# Patient Record
Sex: FEMALE | Race: WHITE | NOT HISPANIC OR LATINO | Employment: OTHER | URBAN - METROPOLITAN AREA
[De-identification: names, ages, dates, MRNs, and addresses within clinical notes are randomized per-mention and may not be internally consistent; named-entity substitution may affect disease eponyms.]

---

## 2025-02-04 ENCOUNTER — HOSPITAL ENCOUNTER (INPATIENT)
Facility: HOSPITAL | Age: 80
LOS: 3 days | Discharge: NURSING, REHAB OR LONG-TERM CARE | End: 2025-02-07
Attending: STUDENT IN AN ORGANIZED HEALTH CARE EDUCATION/TRAINING PROGRAM | Admitting: INTERNAL MEDICINE
Payer: MEDICARE

## 2025-02-04 ENCOUNTER — APPOINTMENT (OUTPATIENT)
Dept: RADIOLOGY | Facility: HOSPITAL | Age: 80
End: 2025-02-04
Payer: MEDICARE

## 2025-02-04 ENCOUNTER — APPOINTMENT (OUTPATIENT)
Dept: CARDIOLOGY | Facility: HOSPITAL | Age: 80
End: 2025-02-04
Payer: MEDICARE

## 2025-02-04 DIAGNOSIS — M62.82 NON-TRAUMATIC RHABDOMYOLYSIS: ICD-10-CM
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
## 2025-02-04 DIAGNOSIS — R94.31 ABNORMAL EKG: ICD-10-CM

## 2025-02-04 DIAGNOSIS — N39.0 ACUTE URINARY TRACT INFECTION: ICD-10-CM

## 2025-02-04 LAB
ALBUMIN SERPL BCP-MCNC: 4 G/DL (ref 3.4–5)
ALP SERPL-CCNC: 65 U/L (ref 33–136)
ALT SERPL W P-5'-P-CCNC: 71 U/L (ref 7–45)
ANION GAP SERPL CALCULATED.3IONS-SCNC: 18 MMOL/L (ref 10–20)
APPEARANCE UR: CLEAR
AST SERPL W P-5'-P-CCNC: 83 U/L (ref 9–39)
BACTERIA #/AREA URNS AUTO: ABNORMAL /HPF
BASOPHILS # BLD AUTO: 0.04 X10*3/UL (ref 0–0.1)
BASOPHILS NFR BLD AUTO: 0.3 %
BILIRUB SERPL-MCNC: 0.9 MG/DL (ref 0–1.2)
BILIRUB UR STRIP.AUTO-MCNC: NEGATIVE MG/DL
BUN SERPL-MCNC: 21 MG/DL (ref 6–23)
CALCIUM SERPL-MCNC: 9.5 MG/DL (ref 8.6–10.3)
CARDIAC TROPONIN I PNL SERPL HS: 163 NG/L (ref 0–13)
CARDIAC TROPONIN I PNL SERPL HS: 166 NG/L (ref 0–13)
CHLORIDE SERPL-SCNC: 114 MMOL/L (ref 98–107)
CK SERPL-CCNC: 2262 U/L (ref 0–215)
CO2 SERPL-SCNC: 25 MMOL/L (ref 21–32)
COLOR UR: YELLOW
CREAT SERPL-MCNC: 0.73 MG/DL (ref 0.5–1.05)
EGFRCR SERPLBLD CKD-EPI 2021: 84 ML/MIN/1.73M*2
EOSINOPHIL # BLD AUTO: 0.05 X10*3/UL (ref 0–0.4)
EOSINOPHIL NFR BLD AUTO: 0.4 %
ERYTHROCYTE [DISTWIDTH] IN BLOOD BY AUTOMATED COUNT: 14.6 % (ref 11.5–14.5)
FLUAV RNA RESP QL NAA+PROBE: NOT DETECTED
FLUBV RNA RESP QL NAA+PROBE: NOT DETECTED
GLUCOSE SERPL-MCNC: 105 MG/DL (ref 74–99)
GLUCOSE UR STRIP.AUTO-MCNC: NORMAL MG/DL
HCT VFR BLD AUTO: 42.2 % (ref 36–46)
HGB BLD-MCNC: 13.6 G/DL (ref 12–16)
HYALINE CASTS #/AREA URNS AUTO: ABNORMAL /LPF
IMM GRANULOCYTES # BLD AUTO: 0.03 X10*3/UL (ref 0–0.5)
IMM GRANULOCYTES NFR BLD AUTO: 0.2 % (ref 0–0.9)
KETONES UR STRIP.AUTO-MCNC: ABNORMAL MG/DL
LACTATE SERPL-SCNC: 1.4 MMOL/L (ref 0.4–2)
LEUKOCYTE ESTERASE UR QL STRIP.AUTO: NEGATIVE
LYMPHOCYTES # BLD AUTO: 1.03 X10*3/UL (ref 0.8–3)
LYMPHOCYTES NFR BLD AUTO: 8.4 %
MCH RBC QN AUTO: 29.8 PG (ref 26–34)
MCHC RBC AUTO-ENTMCNC: 32.2 G/DL (ref 32–36)
MCV RBC AUTO: 92 FL (ref 80–100)
MONOCYTES # BLD AUTO: 1.3 X10*3/UL (ref 0.05–0.8)
MONOCYTES NFR BLD AUTO: 10.6 %
MUCOUS THREADS #/AREA URNS AUTO: ABNORMAL /LPF
NEUTROPHILS # BLD AUTO: 9.86 X10*3/UL (ref 1.6–5.5)
NEUTROPHILS NFR BLD AUTO: 80.1 %
NITRITE UR QL STRIP.AUTO: NEGATIVE
NRBC BLD-RTO: 0 /100 WBCS (ref 0–0)
PH UR STRIP.AUTO: 6 [PH]
PLATELET # BLD AUTO: 341 X10*3/UL (ref 150–450)
POTASSIUM SERPL-SCNC: 4.7 MMOL/L (ref 3.5–5.3)
PROT SERPL-MCNC: 6.7 G/DL (ref 6.4–8.2)
PROT UR STRIP.AUTO-MCNC: ABNORMAL MG/DL
RBC # BLD AUTO: 4.57 X10*6/UL (ref 4–5.2)
RBC # UR STRIP.AUTO: ABNORMAL MG/DL
RBC #/AREA URNS AUTO: ABNORMAL /HPF
SARS-COV-2 RNA RESP QL NAA+PROBE: NOT DETECTED
SODIUM SERPL-SCNC: 152 MMOL/L (ref 136–145)
SP GR UR STRIP.AUTO: 1.03
UROBILINOGEN UR STRIP.AUTO-MCNC: ABNORMAL MG/DL
WBC # BLD AUTO: 12.3 X10*3/UL (ref 4.4–11.3)
WBC #/AREA URNS AUTO: ABNORMAL /HPF

## 2025-02-04 PROCEDURE — 70450 CT HEAD/BRAIN W/O DYE: CPT

## 2025-02-04 PROCEDURE — 73080 X-RAY EXAM OF ELBOW: CPT | Mod: LT

## 2025-02-04 PROCEDURE — 80053 COMPREHEN METABOLIC PANEL: CPT | Performed by: STUDENT IN AN ORGANIZED HEALTH CARE EDUCATION/TRAINING PROGRAM

## 2025-02-04 PROCEDURE — 73080 X-RAY EXAM OF ELBOW: CPT | Mod: LEFT SIDE | Performed by: RADIOLOGY

## 2025-02-04 PROCEDURE — 1200000002 HC GENERAL ROOM WITH TELEMETRY DAILY

## 2025-02-04 PROCEDURE — 36415 COLL VENOUS BLD VENIPUNCTURE: CPT | Performed by: STUDENT IN AN ORGANIZED HEALTH CARE EDUCATION/TRAINING PROGRAM

## 2025-02-04 PROCEDURE — 93005 ELECTROCARDIOGRAM TRACING: CPT

## 2025-02-04 PROCEDURE — 2500000004 HC RX 250 GENERAL PHARMACY W/ HCPCS (ALT 636 FOR OP/ED): Performed by: STUDENT IN AN ORGANIZED HEALTH CARE EDUCATION/TRAINING PROGRAM

## 2025-02-04 PROCEDURE — 96361 HYDRATE IV INFUSION ADD-ON: CPT

## 2025-02-04 PROCEDURE — 99285 EMERGENCY DEPT VISIT HI MDM: CPT | Mod: 25 | Performed by: STUDENT IN AN ORGANIZED HEALTH CARE EDUCATION/TRAINING PROGRAM

## 2025-02-04 PROCEDURE — 87636 SARSCOV2 & INF A&B AMP PRB: CPT | Performed by: STUDENT IN AN ORGANIZED HEALTH CARE EDUCATION/TRAINING PROGRAM

## 2025-02-04 PROCEDURE — 87040 BLOOD CULTURE FOR BACTERIA: CPT | Mod: WESLAB | Performed by: STUDENT IN AN ORGANIZED HEALTH CARE EDUCATION/TRAINING PROGRAM

## 2025-02-04 PROCEDURE — 96374 THER/PROPH/DIAG INJ IV PUSH: CPT

## 2025-02-04 PROCEDURE — 73502 X-RAY EXAM HIP UNI 2-3 VIEWS: CPT | Mod: LEFT SIDE | Performed by: RADIOLOGY

## 2025-02-04 PROCEDURE — 81001 URINALYSIS AUTO W/SCOPE: CPT | Performed by: STUDENT IN AN ORGANIZED HEALTH CARE EDUCATION/TRAINING PROGRAM

## 2025-02-04 PROCEDURE — 82550 ASSAY OF CK (CPK): CPT | Performed by: STUDENT IN AN ORGANIZED HEALTH CARE EDUCATION/TRAINING PROGRAM

## 2025-02-04 PROCEDURE — 2500000004 HC RX 250 GENERAL PHARMACY W/ HCPCS (ALT 636 FOR OP/ED): Performed by: INTERNAL MEDICINE

## 2025-02-04 PROCEDURE — 84484 ASSAY OF TROPONIN QUANT: CPT | Performed by: STUDENT IN AN ORGANIZED HEALTH CARE EDUCATION/TRAINING PROGRAM

## 2025-02-04 PROCEDURE — 85025 COMPLETE CBC W/AUTO DIFF WBC: CPT | Performed by: STUDENT IN AN ORGANIZED HEALTH CARE EDUCATION/TRAINING PROGRAM

## 2025-02-04 PROCEDURE — 73502 X-RAY EXAM HIP UNI 2-3 VIEWS: CPT | Mod: LT

## 2025-02-04 PROCEDURE — 83605 ASSAY OF LACTIC ACID: CPT | Performed by: STUDENT IN AN ORGANIZED HEALTH CARE EDUCATION/TRAINING PROGRAM

## 2025-02-04 PROCEDURE — P9612 CATHETERIZE FOR URINE SPEC: HCPCS

## 2025-02-04 PROCEDURE — 70450 CT HEAD/BRAIN W/O DYE: CPT | Performed by: RADIOLOGY

## 2025-02-04 RX ORDER — CEFTRIAXONE 1 G/50ML
1 INJECTION, SOLUTION INTRAVENOUS ONCE
Status: COMPLETED | OUTPATIENT
Start: 2025-02-04 | End: 2025-02-05

## 2025-02-04 RX ORDER — ONDANSETRON 4 MG/1
4 TABLET, ORALLY DISINTEGRATING ORAL 4 TIMES DAILY PRN
Status: DISCONTINUED | OUTPATIENT
Start: 2025-02-04 | End: 2025-02-07 | Stop reason: HOSPADM

## 2025-02-04 RX ORDER — ACETAMINOPHEN 325 MG/1
650 TABLET ORAL EVERY 6 HOURS PRN
Status: DISCONTINUED | OUTPATIENT
Start: 2025-02-04 | End: 2025-02-07 | Stop reason: HOSPADM

## 2025-02-04 RX ORDER — ACETAMINOPHEN 500 MG
5 TABLET ORAL NIGHTLY PRN
Status: DISCONTINUED | OUTPATIENT
Start: 2025-02-04 | End: 2025-02-07 | Stop reason: HOSPADM

## 2025-02-04 RX ORDER — SODIUM CHLORIDE 9 MG/ML
125 INJECTION, SOLUTION INTRAVENOUS CONTINUOUS
Status: DISCONTINUED | OUTPATIENT
Start: 2025-02-04 | End: 2025-02-05

## 2025-02-04 RX ORDER — ALUMINUM HYDROXIDE, MAGNESIUM HYDROXIDE, AND SIMETHICONE 1200; 120; 1200 MG/30ML; MG/30ML; MG/30ML
30 SUSPENSION ORAL 4 TIMES DAILY PRN
Status: DISCONTINUED | OUTPATIENT
Start: 2025-02-04 | End: 2025-02-07 | Stop reason: HOSPADM

## 2025-02-04 RX ORDER — BISACODYL 5 MG
5 TABLET, DELAYED RELEASE (ENTERIC COATED) ORAL DAILY PRN
Status: DISCONTINUED | OUTPATIENT
Start: 2025-02-04 | End: 2025-02-07 | Stop reason: HOSPADM

## 2025-02-04 RX ORDER — ENOXAPARIN SODIUM 100 MG/ML
40 INJECTION SUBCUTANEOUS DAILY
Status: DISCONTINUED | OUTPATIENT
Start: 2025-02-05 | End: 2025-02-07 | Stop reason: HOSPADM

## 2025-02-04 RX ORDER — ONDANSETRON HYDROCHLORIDE 2 MG/ML
4 INJECTION, SOLUTION INTRAVENOUS EVERY 6 HOURS PRN
Status: DISCONTINUED | OUTPATIENT
Start: 2025-02-04 | End: 2025-02-07 | Stop reason: HOSPADM

## 2025-02-04 RX ADMIN — CEFTRIAXONE SODIUM 1 G: 1 INJECTION, SOLUTION INTRAVENOUS at 23:03

## 2025-02-04 RX ADMIN — SODIUM CHLORIDE 125 ML/HR: 900 INJECTION, SOLUTION INTRAVENOUS at 23:55

## 2025-02-04 RX ADMIN — SODIUM CHLORIDE 1000 ML: 900 INJECTION, SOLUTION INTRAVENOUS at 20:40

## 2025-02-04 ASSESSMENT — COLUMBIA-SUICIDE SEVERITY RATING SCALE - C-SSRS
6. HAVE YOU EVER DONE ANYTHING, STARTED TO DO ANYTHING, OR PREPARED TO DO ANYTHING TO END YOUR LIFE?: NO
2. HAVE YOU ACTUALLY HAD ANY THOUGHTS OF KILLING YOURSELF?: NO
1. IN THE PAST MONTH, HAVE YOU WISHED YOU WERE DEAD OR WISHED YOU COULD GO TO SLEEP AND NOT WAKE UP?: NO

## 2025-02-04 ASSESSMENT — PAIN - FUNCTIONAL ASSESSMENT: PAIN_FUNCTIONAL_ASSESSMENT: 0-10

## 2025-02-04 ASSESSMENT — PAIN SCALES - GENERAL: PAINLEVEL_OUTOF10: 0 - NO PAIN

## 2025-02-05 ENCOUNTER — APPOINTMENT (OUTPATIENT)
Dept: RADIOLOGY | Facility: HOSPITAL | Age: 80
End: 2025-02-05
Payer: MEDICARE

## 2025-02-05 ENCOUNTER — APPOINTMENT (OUTPATIENT)
Dept: CARDIOLOGY | Facility: HOSPITAL | Age: 80
End: 2025-02-05
Payer: MEDICARE

## 2025-02-05 LAB
ALBUMIN SERPL BCP-MCNC: 3.2 G/DL (ref 3.4–5)
ALP SERPL-CCNC: 54 U/L (ref 33–136)
ALT SERPL W P-5'-P-CCNC: 55 U/L (ref 7–45)
ANION GAP SERPL CALCULATED.3IONS-SCNC: 11 MMOL/L (ref 10–20)
AORTIC VALVE PEAK VELOCITY: 1.65 M/S
APPEARANCE UR: CLEAR
AST SERPL W P-5'-P-CCNC: 53 U/L (ref 9–39)
ATRIAL RATE: 101 BPM
AV PEAK GRADIENT: 11 MMHG
AVA (PEAK VEL): 2.39 CM2
BILIRUB SERPL-MCNC: 0.6 MG/DL (ref 0–1.2)
BILIRUB UR STRIP.AUTO-MCNC: NEGATIVE MG/DL
BUN SERPL-MCNC: 19 MG/DL (ref 6–23)
CALCIUM SERPL-MCNC: 8.7 MG/DL (ref 8.6–10.3)
CHLORIDE SERPL-SCNC: 115 MMOL/L (ref 98–107)
CHOLEST SERPL-MCNC: 240 MG/DL (ref 0–199)
CHOLEST/HDLC SERPL: 5.5 {RATIO}
CK SERPL-CCNC: 1175 U/L (ref 0–215)
CO2 SERPL-SCNC: 27 MMOL/L (ref 21–32)
COLOR UR: YELLOW
CREAT SERPL-MCNC: 0.61 MG/DL (ref 0.5–1.05)
EGFRCR SERPLBLD CKD-EPI 2021: >90 ML/MIN/1.73M*2
EJECTION FRACTION APICAL 4 CHAMBER: 58.2
EJECTION FRACTION: 63 %
ERYTHROCYTE [DISTWIDTH] IN BLOOD BY AUTOMATED COUNT: 14.6 % (ref 11.5–14.5)
GLUCOSE SERPL-MCNC: 202 MG/DL (ref 74–99)
GLUCOSE UR STRIP.AUTO-MCNC: NORMAL MG/DL
HCT VFR BLD AUTO: 39.4 % (ref 36–46)
HDLC SERPL-MCNC: 43.9 MG/DL
HGB BLD-MCNC: 12.5 G/DL (ref 12–16)
HOLD SPECIMEN: NORMAL
KETONES UR STRIP.AUTO-MCNC: ABNORMAL MG/DL
LDLC SERPL CALC-MCNC: 173 MG/DL
LEFT ATRIUM VOLUME AREA LENGTH INDEX BSA: 21.9 ML/M2
LEFT VENTRICLE INTERNAL DIMENSION DIASTOLE: 3.77 CM (ref 3.5–6)
LEFT VENTRICULAR OUTFLOW TRACT DIAMETER: 1.98 CM
LEUKOCYTE ESTERASE UR QL STRIP.AUTO: NEGATIVE
LV EJECTION FRACTION BIPLANE: 59 %
MCH RBC QN AUTO: 29.5 PG (ref 26–34)
MCHC RBC AUTO-ENTMCNC: 31.7 G/DL (ref 32–36)
MCV RBC AUTO: 93 FL (ref 80–100)
MITRAL VALVE E/A RATIO: 1.11
MITRAL VALVE E/E' RATIO: 11.36
MUCOUS THREADS #/AREA URNS AUTO: NORMAL /LPF
NITRITE UR QL STRIP.AUTO: NEGATIVE
NON HDL CHOLESTEROL: 196 MG/DL (ref 0–149)
NRBC BLD-RTO: 0 /100 WBCS (ref 0–0)
P OFFSET: 186 MS
P ONSET: 112 MS
PH UR STRIP.AUTO: 6.5 [PH]
PLATELET # BLD AUTO: 319 X10*3/UL (ref 150–450)
POTASSIUM SERPL-SCNC: 2.5 MMOL/L (ref 3.5–5.3)
POTASSIUM SERPL-SCNC: 2.9 MMOL/L (ref 3.5–5.3)
PR INTERVAL: 206 MS
PROT SERPL-MCNC: 5.5 G/DL (ref 6.4–8.2)
PROT UR STRIP.AUTO-MCNC: ABNORMAL MG/DL
Q ONSET: 215 MS
QRS COUNT: 16 BEATS
QRS DURATION: 134 MS
QT INTERVAL: 420 MS
QTC CALCULATION(BAZETT): 544 MS
QTC FREDERICIA: 499 MS
R AXIS: -4 DEGREES
RBC # BLD AUTO: 4.24 X10*6/UL (ref 4–5.2)
RBC # UR STRIP.AUTO: NEGATIVE MG/DL
RBC #/AREA URNS AUTO: NORMAL /HPF
RIGHT VENTRICLE FREE WALL PEAK S': 16.51 CM/S
RIGHT VENTRICLE PEAK SYSTOLIC PRESSURE: 31.2 MMHG
SODIUM SERPL-SCNC: 150 MMOL/L (ref 136–145)
SP GR UR STRIP.AUTO: 1.03
T AXIS: 3 DEGREES
T OFFSET: 425 MS
TRICUSPID ANNULAR PLANE SYSTOLIC EXCURSION: 1.9 CM
TRIGL SERPL-MCNC: 118 MG/DL (ref 0–149)
TSH SERPL-ACNC: 1.45 MIU/L (ref 0.44–3.98)
UROBILINOGEN UR STRIP.AUTO-MCNC: ABNORMAL MG/DL
VENTRICULAR RATE: 101 BPM
VLDL: 24 MG/DL (ref 0–40)
WBC # BLD AUTO: 11 X10*3/UL (ref 4.4–11.3)
WBC #/AREA URNS AUTO: NORMAL /HPF

## 2025-02-05 PROCEDURE — 85027 COMPLETE CBC AUTOMATED: CPT | Performed by: INTERNAL MEDICINE

## 2025-02-05 PROCEDURE — 80061 LIPID PANEL: CPT

## 2025-02-05 PROCEDURE — 81001 URINALYSIS AUTO W/SCOPE: CPT | Performed by: INTERNAL MEDICINE

## 2025-02-05 PROCEDURE — 82550 ASSAY OF CK (CPK): CPT | Performed by: INTERNAL MEDICINE

## 2025-02-05 PROCEDURE — 2500000004 HC RX 250 GENERAL PHARMACY W/ HCPCS (ALT 636 FOR OP/ED): Performed by: INTERNAL MEDICINE

## 2025-02-05 PROCEDURE — 99204 OFFICE O/P NEW MOD 45 MIN: CPT

## 2025-02-05 PROCEDURE — 99232 SBSQ HOSP IP/OBS MODERATE 35: CPT | Performed by: INTERNAL MEDICINE

## 2025-02-05 PROCEDURE — 97165 OT EVAL LOW COMPLEX 30 MIN: CPT | Mod: GO

## 2025-02-05 PROCEDURE — 93356 MYOCRD STRAIN IMG SPCKL TRCK: CPT

## 2025-02-05 PROCEDURE — 36415 COLL VENOUS BLD VENIPUNCTURE: CPT | Performed by: INTERNAL MEDICINE

## 2025-02-05 PROCEDURE — 80053 COMPREHEN METABOLIC PANEL: CPT | Performed by: INTERNAL MEDICINE

## 2025-02-05 PROCEDURE — 2500000001 HC RX 250 WO HCPCS SELF ADMINISTERED DRUGS (ALT 637 FOR MEDICARE OP): Performed by: INTERNAL MEDICINE

## 2025-02-05 PROCEDURE — 93356 MYOCRD STRAIN IMG SPCKL TRCK: CPT | Performed by: INTERNAL MEDICINE

## 2025-02-05 PROCEDURE — 1200000002 HC GENERAL ROOM WITH TELEMETRY DAILY

## 2025-02-05 PROCEDURE — 99223 1ST HOSP IP/OBS HIGH 75: CPT | Performed by: INTERNAL MEDICINE

## 2025-02-05 PROCEDURE — 84443 ASSAY THYROID STIM HORMONE: CPT | Performed by: INTERNAL MEDICINE

## 2025-02-05 PROCEDURE — 76705 ECHO EXAM OF ABDOMEN: CPT

## 2025-02-05 PROCEDURE — 76376 3D RENDER W/INTRP POSTPROCES: CPT | Performed by: INTERNAL MEDICINE

## 2025-02-05 PROCEDURE — 99291 CRITICAL CARE FIRST HOUR: CPT | Performed by: STUDENT IN AN ORGANIZED HEALTH CARE EDUCATION/TRAINING PROGRAM

## 2025-02-05 PROCEDURE — 93306 TTE W/DOPPLER COMPLETE: CPT | Performed by: INTERNAL MEDICINE

## 2025-02-05 PROCEDURE — 76705 ECHO EXAM OF ABDOMEN: CPT | Performed by: RADIOLOGY

## 2025-02-05 PROCEDURE — 84132 ASSAY OF SERUM POTASSIUM: CPT | Performed by: INTERNAL MEDICINE

## 2025-02-05 RX ORDER — POTASSIUM CHLORIDE 1.5 G/1.58G
20 POWDER, FOR SOLUTION ORAL ONCE
Status: COMPLETED | OUTPATIENT
Start: 2025-02-05 | End: 2025-02-05

## 2025-02-05 RX ORDER — DEXTROSE MONOHYDRATE 50 MG/ML
125 INJECTION, SOLUTION INTRAVENOUS CONTINUOUS
Status: DISCONTINUED | OUTPATIENT
Start: 2025-02-05 | End: 2025-02-05

## 2025-02-05 RX ORDER — SODIUM CHLORIDE AND POTASSIUM CHLORIDE 150; 450 MG/100ML; MG/100ML
75 INJECTION, SOLUTION INTRAVENOUS CONTINUOUS
Status: DISCONTINUED | OUTPATIENT
Start: 2025-02-05 | End: 2025-02-07

## 2025-02-05 RX ORDER — POTASSIUM CHLORIDE 14.9 MG/ML
20 INJECTION INTRAVENOUS
Status: COMPLETED | OUTPATIENT
Start: 2025-02-05 | End: 2025-02-05

## 2025-02-05 RX ORDER — POTASSIUM CHLORIDE 1.5 G/1.58G
40 POWDER, FOR SOLUTION ORAL ONCE
Status: DISCONTINUED | OUTPATIENT
Start: 2025-02-05 | End: 2025-02-05

## 2025-02-05 RX ADMIN — SODIUM CHLORIDE AND POTASSIUM CHLORIDE 75 ML/HR: 4.5; 1.49 INJECTION, SOLUTION INTRAVENOUS at 16:46

## 2025-02-05 RX ADMIN — ENOXAPARIN SODIUM 40 MG: 40 INJECTION SUBCUTANEOUS at 10:46

## 2025-02-05 RX ADMIN — POTASSIUM CHLORIDE 20 MEQ: 200 INJECTION, SOLUTION INTRAVENOUS at 14:01

## 2025-02-05 RX ADMIN — DEXTROSE MONOHYDRATE 125 ML/HR: 5 INJECTION, SOLUTION INTRAVENOUS at 01:47

## 2025-02-05 RX ADMIN — DEXTROSE MONOHYDRATE 125 ML/HR: 5 INJECTION, SOLUTION INTRAVENOUS at 10:52

## 2025-02-05 RX ADMIN — POTASSIUM CHLORIDE 20 MEQ: 200 INJECTION, SOLUTION INTRAVENOUS at 10:47

## 2025-02-05 RX ADMIN — POTASSIUM CHLORIDE 20 MEQ: 1.5 FOR SOLUTION ORAL at 10:46

## 2025-02-05 SDOH — HEALTH STABILITY: PHYSICAL HEALTH: ON AVERAGE, HOW MANY MINUTES DO YOU ENGAGE IN EXERCISE AT THIS LEVEL?: 0 MIN

## 2025-02-05 SDOH — ECONOMIC STABILITY: FOOD INSECURITY: WITHIN THE PAST 12 MONTHS, YOU WORRIED THAT YOUR FOOD WOULD RUN OUT BEFORE YOU GOT THE MONEY TO BUY MORE.: NEVER TRUE

## 2025-02-05 SDOH — SOCIAL STABILITY: SOCIAL INSECURITY: ARE YOU OR HAVE YOU BEEN THREATENED OR ABUSED PHYSICALLY, EMOTIONALLY, OR SEXUALLY BY ANYONE?: NO

## 2025-02-05 SDOH — SOCIAL STABILITY: SOCIAL NETWORK
DO YOU BELONG TO ANY CLUBS OR ORGANIZATIONS SUCH AS CHURCH GROUPS, UNIONS, FRATERNAL OR ATHLETIC GROUPS, OR SCHOOL GROUPS?: NO

## 2025-02-05 SDOH — SOCIAL STABILITY: SOCIAL INSECURITY
WITHIN THE LAST YEAR, HAVE YOU BEEN KICKED, HIT, SLAPPED, OR OTHERWISE PHYSICALLY HURT BY YOUR PARTNER OR EX-PARTNER?: NO

## 2025-02-05 SDOH — SOCIAL STABILITY: SOCIAL INSECURITY
WITHIN THE LAST YEAR, HAVE YOU BEEN RAPED OR FORCED TO HAVE ANY KIND OF SEXUAL ACTIVITY BY YOUR PARTNER OR EX-PARTNER?: NO

## 2025-02-05 SDOH — SOCIAL STABILITY: SOCIAL INSECURITY: ARE YOU MARRIED, WIDOWED, DIVORCED, SEPARATED, NEVER MARRIED, OR LIVING WITH A PARTNER?: NEVER MARRIED

## 2025-02-05 SDOH — SOCIAL STABILITY: SOCIAL NETWORK: HOW OFTEN DO YOU ATTEND CHURCH OR RELIGIOUS SERVICES?: NEVER

## 2025-02-05 SDOH — HEALTH STABILITY: MENTAL HEALTH: HOW OFTEN DO YOU HAVE A DRINK CONTAINING ALCOHOL?: NEVER

## 2025-02-05 SDOH — SOCIAL STABILITY: SOCIAL INSECURITY: DO YOU FEEL ANYONE HAS EXPLOITED OR TAKEN ADVANTAGE OF YOU FINANCIALLY OR OF YOUR PERSONAL PROPERTY?: NO

## 2025-02-05 SDOH — ECONOMIC STABILITY: HOUSING INSECURITY: IN THE PAST 12 MONTHS, HOW MANY TIMES HAVE YOU MOVED WHERE YOU WERE LIVING?: 0

## 2025-02-05 SDOH — HEALTH STABILITY: MENTAL HEALTH: HOW MANY DRINKS CONTAINING ALCOHOL DO YOU HAVE ON A TYPICAL DAY WHEN YOU ARE DRINKING?: PATIENT DOES NOT DRINK

## 2025-02-05 SDOH — SOCIAL STABILITY: SOCIAL NETWORK: HOW OFTEN DO YOU GET TOGETHER WITH FRIENDS OR RELATIVES?: TWICE A WEEK

## 2025-02-05 SDOH — HEALTH STABILITY: MENTAL HEALTH: HOW OFTEN DO YOU HAVE SIX OR MORE DRINKS ON ONE OCCASION?: NEVER

## 2025-02-05 SDOH — ECONOMIC STABILITY: FOOD INSECURITY: HOW HARD IS IT FOR YOU TO PAY FOR THE VERY BASICS LIKE FOOD, HOUSING, MEDICAL CARE, AND HEATING?: NOT HARD AT ALL

## 2025-02-05 SDOH — SOCIAL STABILITY: SOCIAL INSECURITY: HAVE YOU HAD ANY THOUGHTS OF HARMING ANYONE ELSE?: NO

## 2025-02-05 SDOH — SOCIAL STABILITY: SOCIAL INSECURITY: WITHIN THE LAST YEAR, HAVE YOU BEEN AFRAID OF YOUR PARTNER OR EX-PARTNER?: NO

## 2025-02-05 SDOH — SOCIAL STABILITY: SOCIAL INSECURITY: DO YOU FEEL UNSAFE GOING BACK TO THE PLACE WHERE YOU ARE LIVING?: NO

## 2025-02-05 SDOH — SOCIAL STABILITY: SOCIAL INSECURITY: HAVE YOU HAD THOUGHTS OF HARMING ANYONE ELSE?: NO

## 2025-02-05 SDOH — SOCIAL STABILITY: SOCIAL INSECURITY: ARE THERE ANY APPARENT SIGNS OF INJURIES/BEHAVIORS THAT COULD BE RELATED TO ABUSE/NEGLECT?: NO

## 2025-02-05 SDOH — ECONOMIC STABILITY: HOUSING INSECURITY: AT ANY TIME IN THE PAST 12 MONTHS, WERE YOU HOMELESS OR LIVING IN A SHELTER (INCLUDING NOW)?: NO

## 2025-02-05 SDOH — HEALTH STABILITY: MENTAL HEALTH
DO YOU FEEL STRESS - TENSE, RESTLESS, NERVOUS, OR ANXIOUS, OR UNABLE TO SLEEP AT NIGHT BECAUSE YOUR MIND IS TROUBLED ALL THE TIME - THESE DAYS?: ONLY A LITTLE

## 2025-02-05 SDOH — SOCIAL STABILITY: SOCIAL INSECURITY: DOES ANYONE TRY TO KEEP YOU FROM HAVING/CONTACTING OTHER FRIENDS OR DOING THINGS OUTSIDE YOUR HOME?: NO

## 2025-02-05 SDOH — SOCIAL STABILITY: SOCIAL NETWORK: HOW OFTEN DO YOU ATTEND MEETINGS OF THE CLUBS OR ORGANIZATIONS YOU BELONG TO?: NEVER

## 2025-02-05 SDOH — SOCIAL STABILITY: SOCIAL INSECURITY: POSSIBLE ABUSE REPORTED TO:: ADVOCATE

## 2025-02-05 SDOH — HEALTH STABILITY: MENTAL HEALTH: EXPERIENCED ANY OF THE FOLLOWING LIFE EVENTS: SOCIAL LOSS (BANKRUPTCY, DIVORCE, WORK-RELATED STRESS)

## 2025-02-05 SDOH — SOCIAL STABILITY: SOCIAL INSECURITY: HAS ANYONE EVER THREATENED TO HURT YOUR FAMILY OR YOUR PETS?: NO

## 2025-02-05 SDOH — ECONOMIC STABILITY: HOUSING INSECURITY: IN THE LAST 12 MONTHS, WAS THERE A TIME WHEN YOU WERE NOT ABLE TO PAY THE MORTGAGE OR RENT ON TIME?: NO

## 2025-02-05 SDOH — SOCIAL STABILITY: SOCIAL INSECURITY: WERE YOU ABLE TO COMPLETE ALL THE BEHAVIORAL HEALTH SCREENINGS?: YES

## 2025-02-05 SDOH — ECONOMIC STABILITY: FOOD INSECURITY: WITHIN THE PAST 12 MONTHS, THE FOOD YOU BOUGHT JUST DIDN'T LAST AND YOU DIDN'T HAVE MONEY TO GET MORE.: NEVER TRUE

## 2025-02-05 SDOH — ECONOMIC STABILITY: TRANSPORTATION INSECURITY: IN THE PAST 12 MONTHS, HAS LACK OF TRANSPORTATION KEPT YOU FROM MEDICAL APPOINTMENTS OR FROM GETTING MEDICATIONS?: NO

## 2025-02-05 SDOH — ECONOMIC STABILITY: INCOME INSECURITY: IN THE PAST 12 MONTHS HAS THE ELECTRIC, GAS, OIL, OR WATER COMPANY THREATENED TO SHUT OFF SERVICES IN YOUR HOME?: NO

## 2025-02-05 SDOH — SOCIAL STABILITY: SOCIAL INSECURITY: WITHIN THE LAST YEAR, HAVE YOU BEEN HUMILIATED OR EMOTIONALLY ABUSED IN OTHER WAYS BY YOUR PARTNER OR EX-PARTNER?: NO

## 2025-02-05 SDOH — HEALTH STABILITY: PHYSICAL HEALTH
HOW OFTEN DO YOU NEED TO HAVE SOMEONE HELP YOU WHEN YOU READ INSTRUCTIONS, PAMPHLETS, OR OTHER WRITTEN MATERIAL FROM YOUR DOCTOR OR PHARMACY?: NEVER

## 2025-02-05 SDOH — HEALTH STABILITY: PHYSICAL HEALTH: ON AVERAGE, HOW MANY DAYS PER WEEK DO YOU ENGAGE IN MODERATE TO STRENUOUS EXERCISE (LIKE A BRISK WALK)?: 0 DAYS

## 2025-02-05 SDOH — SOCIAL STABILITY: SOCIAL NETWORK: IN A TYPICAL WEEK, HOW MANY TIMES DO YOU TALK ON THE PHONE WITH FAMILY, FRIENDS, OR NEIGHBORS?: NEVER

## 2025-02-05 SDOH — SOCIAL STABILITY: SOCIAL INSECURITY: ABUSE: ADULT

## 2025-02-05 ASSESSMENT — COGNITIVE AND FUNCTIONAL STATUS - GENERAL
CLIMB 3 TO 5 STEPS WITH RAILING: TOTAL
PERSONAL GROOMING: A LOT
EATING MEALS: A LOT
MOBILITY SCORE: 15
DRESSING REGULAR UPPER BODY CLOTHING: A LITTLE
MOVING TO AND FROM BED TO CHAIR: A LITTLE
HELP NEEDED FOR BATHING: A LITTLE
EATING MEALS: A LITTLE
PERSONAL GROOMING: A LOT
DRESSING REGULAR LOWER BODY CLOTHING: TOTAL
CLIMB 3 TO 5 STEPS WITH RAILING: TOTAL
MOVING FROM LYING ON BACK TO SITTING ON SIDE OF FLAT BED WITH BEDRAILS: A LITTLE
PATIENT BASELINE BEDBOUND: NO
DAILY ACTIVITIY SCORE: 16
EATING MEALS: A LITTLE
PERSONAL GROOMING: A LOT
WALKING IN HOSPITAL ROOM: A LITTLE
DRESSING REGULAR UPPER BODY CLOTHING: A LITTLE
TOILETING: A LOT
MOVING FROM LYING ON BACK TO SITTING ON SIDE OF FLAT BED WITH BEDRAILS: A LITTLE
TURNING FROM BACK TO SIDE WHILE IN FLAT BAD: A LITTLE
DRESSING REGULAR UPPER BODY CLOTHING: A LOT
DAILY ACTIVITIY SCORE: 16
STANDING UP FROM CHAIR USING ARMS: A LITTLE
DRESSING REGULAR LOWER BODY CLOTHING: A LITTLE
WALKING IN HOSPITAL ROOM: A LITTLE
TOILETING: A LOT
HELP NEEDED FOR BATHING: A LOT
STANDING UP FROM CHAIR USING ARMS: A LITTLE
HELP NEEDED FOR BATHING: A LITTLE
MOBILITY SCORE: 16
DAILY ACTIVITIY SCORE: 11
TURNING FROM BACK TO SIDE WHILE IN FLAT BAD: A LOT
DRESSING REGULAR LOWER BODY CLOTHING: A LITTLE
MOVING TO AND FROM BED TO CHAIR: A LITTLE
TOILETING: A LOT

## 2025-02-05 ASSESSMENT — ENCOUNTER SYMPTOMS
ALLERGIC/IMMUNOLOGIC NEGATIVE: 1
CONSTITUTIONAL NEGATIVE: 1
RESPIRATORY NEGATIVE: 1
EYES NEGATIVE: 1
MUSCULOSKELETAL NEGATIVE: 1
GASTROINTESTINAL NEGATIVE: 1
HEMATOLOGIC/LYMPHATIC NEGATIVE: 1
NEUROLOGICAL NEGATIVE: 1
PSYCHIATRIC NEGATIVE: 1
CARDIOVASCULAR NEGATIVE: 1
ENDOCRINE NEGATIVE: 1

## 2025-02-05 ASSESSMENT — ACTIVITIES OF DAILY LIVING (ADL)
JUDGMENT_ADEQUATE_SAFELY_COMPLETE_DAILY_ACTIVITIES: YES
LACK_OF_TRANSPORTATION: NO
ADEQUATE_TO_COMPLETE_ADL: YES
HEARING - RIGHT EAR: FUNCTIONAL
TOILETING: INDEPENDENT
PATIENT'S MEMORY ADEQUATE TO SAFELY COMPLETE DAILY ACTIVITIES?: NO
DRESSING YOURSELF: INDEPENDENT
LACK_OF_TRANSPORTATION: NO
HEARING - LEFT EAR: FUNCTIONAL
LACK_OF_TRANSPORTATION: NO
ADL_ASSISTANCE: INDEPENDENT
WALKS IN HOME: INDEPENDENT
FEEDING YOURSELF: INDEPENDENT
GROOMING: INDEPENDENT
BATHING: INDEPENDENT

## 2025-02-05 ASSESSMENT — PATIENT HEALTH QUESTIONNAIRE - PHQ9
2. FEELING DOWN, DEPRESSED OR HOPELESS: NOT AT ALL
SUM OF ALL RESPONSES TO PHQ9 QUESTIONS 1 & 2: 0
1. LITTLE INTEREST OR PLEASURE IN DOING THINGS: NOT AT ALL

## 2025-02-05 ASSESSMENT — PAIN SCALES - GENERAL
PAINLEVEL_OUTOF10: 0 - NO PAIN
PAINLEVEL_OUTOF10: 0 - NO PAIN

## 2025-02-05 ASSESSMENT — LIFESTYLE VARIABLES
PRESCIPTION_ABUSE_PAST_12_MONTHS: NO
AUDIT-C TOTAL SCORE: 0
TOTAL SCORE: 0
HAVE PEOPLE ANNOYED YOU BY CRITICIZING YOUR DRINKING: NO
HOW OFTEN DO YOU HAVE 6 OR MORE DRINKS ON ONE OCCASION: NEVER
AUDIT-C TOTAL SCORE: 0
SKIP TO QUESTIONS 9-10: 1
HOW MANY STANDARD DRINKS CONTAINING ALCOHOL DO YOU HAVE ON A TYPICAL DAY: PATIENT DOES NOT DRINK
SUBSTANCE_ABUSE_PAST_12_MONTHS: NO
AUDIT-C TOTAL SCORE: 0
EVER FELT BAD OR GUILTY ABOUT YOUR DRINKING: NO
HOW OFTEN DO YOU HAVE A DRINK CONTAINING ALCOHOL: NEVER
EVER HAD A DRINK FIRST THING IN THE MORNING TO STEADY YOUR NERVES TO GET RID OF A HANGOVER: NO
HAVE YOU EVER FELT YOU SHOULD CUT DOWN ON YOUR DRINKING: NO
SKIP TO QUESTIONS 9-10: 1

## 2025-02-05 ASSESSMENT — PAIN - FUNCTIONAL ASSESSMENT: PAIN_FUNCTIONAL_ASSESSMENT: 0-10

## 2025-02-05 NOTE — NURSING NOTE
Patient was cleaned up, full head to toe bath, gown changed with a partial linen change. Patient is incontinent at times so a pure wick was placed.

## 2025-02-05 NOTE — PROGRESS NOTES
"Shirley Grajeda is a 79 y.o. female on day 1 of admission presenting with fall and rhabdomyolysis.     Subjective   She was found on the floor of her home last night by her son.  She was brought to the emergency department at [facility] and found to be in mild rhabdomyolysis.  She had an abnormal urinalysis with WBCs.  At the time of this encounter, she said she did not recall what happened at her home last night. She was awake and alert, oriented to self, date, place.   She has no acute complaints.      Objective     Physical Exam  General: awake, alert, oriented, responsive  Cardiovascular: regular, normal S1 and S2  Lungs: good air entry bilaterally, clear to auscultation  Extremities: no peripheral cyanosis, no pedal edema  Neuro: alert, oriented x 3, no focal weakness      Last Recorded Vitals  Blood pressure (!) 123/94, pulse 84, temperature 36.4 °C (97.5 °F), temperature source Oral, resp. rate 16, height 1.575 m (5' 2\"), weight 68 kg (150 lb), SpO2 97%.  Intake/Output last 3 Shifts:  No intake/output data recorded.    Relevant Results  Lab Results   Component Value Date    WBC 11.0 02/05/2025    HGB 12.5 02/05/2025    HCT 39.4 02/05/2025    MCV 93 02/05/2025     02/05/2025     Lab Results   Component Value Date    GLUCOSE 202 (H) 02/05/2025    CALCIUM 8.7 02/05/2025     (H) 02/05/2025    K 2.9 (LL) 02/05/2025    CO2 27 02/05/2025     (H) 02/05/2025    BUN 19 02/05/2025    CREATININE 0.61 02/05/2025     Scheduled medications  enoxaparin, 40 mg, subcutaneous, Daily  potassium chloride, 20 mEq, intravenous, q2h      Continuous medications  dextrose 5%, 125 mL/hr, Last Rate: 125 mL/hr (02/05/25 0654)      PRN medications  PRN medications: acetaminophen, alum-mag hydroxide-simeth, bisacodyl, melatonin, ondansetron, ondansetron ODT      Assessment/Plan   Assessment & Plan    Fall.    Physical Occupational Therapy.  Patient may need to go to rehab.  She tells me that she has been falling in the " past    Rhabdomyolysis  Continue IV hydration and trend CK levels    Hypernatremia  On 1/2 normal saline    Hypokalemia  Replace potassium    Abnormal urinalysis  On IV rocephin    Elevated liver enzymes  Mild biliary sludge and fatty liver on ultrasound  Trend enzyme levels    Abnormal EKG.   An EKG showed right bundle branch block  Echocardiogram done  Seen by cardiology for abnormal EKG and elevated troponin      Plan  Abnormal UA: Urine culture was not done on the urine sample sent to the lab yesterday, because it was negative for leukocyte esterase.  The patient's nurse today states that the urine appears malodorous.  A repeat urinalysis with reflex microscopy and culture has been ordered.    Tay Roth MD

## 2025-02-05 NOTE — H&P
History Of Present Illness  Shirley Grajeda is a 79 y.o. female presenting with status post fall.  Patient denies any significant past medical history.  She tells me that she does not follow with doctors.  She does not take any medications.  Patient lives alone, she normally ambulates with a walker.  She cannot tell me when she had anything to eat or drink last time.  She agrees that she is dehydrated.  At 3 PM yesterday her son checked on her and she was at her baseline.  He will checked on her at 8 PM and found him on the floor in the bathroom.  According to the patient, she fell when she was trying to get off the toilet.  Denies loss of consciousness.  She tells that she spent on the floor about 2 hours.  When patient was brought to the emergency department, she had elevated CK so she received IV fluids.  Urinalysis was positive for UTI so she received 1 g of Rocephin.  During exam, patient denies any acute problems.  Past Medical History  She has no past medical history on file.  Denies  Surgical History  She has no past surgical history on file.  Eye surgery  Social History  She has no history on file for tobacco use, alcohol use, and drug use.  Lives alone, ambulates with a walker  Family History  No family history on file.  Patient cannot provide  Allergies  Penicillins    Review of Systems   Constitutional: Negative.    HENT: Negative.     Eyes: Negative.    Respiratory: Negative.     Cardiovascular: Negative.    Gastrointestinal: Negative.    Endocrine: Negative.    Genitourinary: Negative.    Musculoskeletal: Negative.    Skin: Negative.    Allergic/Immunologic: Negative.    Neurological: Negative.    Hematological: Negative.    Psychiatric/Behavioral: Negative.     Patient is a very suboptimal historian.     Physical Exam  Location: Emergency department, room 12.  Pi is in no apparent distress.   Cooperative with exam.  Nontoxic-appearing.  Comfortable at rest on room air.  Skin is clean, dry.  No skin  lesions, rashes, ecchymosis.  Head is atraumatic, normocephalic.  Mouth mucosa is pink and very dry. Her lips are cracked.  No mucosal lesions.  No nasal discharge.  Musculoskeletal: No deformities, no muscle swelling.  No point tenderness to palpation.  Neck is supple, no JVD, no carotid bruits.  No lymphadenopathy.  Chest is atraumatic.  No tenderness to palpation.  Lungs are clear to auscultation bilaterally.  No wheezes, no rales, no rhonchi.  Heart: Regular rate and rhythm S1-S2.  Monitor demonstrates sinus tachycardia no murmurs, rubs, gallops.  Peripheral pulses are palpable in all extremities, equal.  Abdomen is soft, not tender, not distended.  Bowel sounds positive in all quadrants.  No rebound, no guarding.  Rectal exam deferred.  Extremities: No peripheral edema, cords, cyanosis, varices.  Neuro: Patient is alert, oriented to name and place.  She knows that she is in the hospital.  Moves all extremities.  No gross focal neurological deficits.  Face is symmetrical.  Tongue is midline.  No visual abnormalities.  No dysarthria or aphasia.  Psychiatric: Patient is cooperative with exam, maintains good eye contact.  No evidence of psychosis, suicidal ideation or depression.   Last Recorded Vitals  /72   Pulse (!) 109   Temp 36.4 °C (97.5 °F) (Oral)   Resp 19   Wt 68 kg (150 lb)   SpO2 97%     Relevant Results        CT head wo IV contrast    Result Date: 2/4/2025  Interpreted By:  Octavio Norwood, STUDY: CT HEAD WO IV CONTRAST;  2/4/2025 9:55 pm   INDICATION: Signs/Symptoms:fall, confusion.     COMPARISON: None.   ACCESSION NUMBER(S): [id number]   ORDERING CLINICIAN: LATIA MEIER   TECHNIQUE: Noncontrast axial CT scan of head was performed. Angled reformats in brain and bone windows were generated. The images were reviewed in bone, brain, blood and soft tissue windows.   FINDINGS: CSF Spaces: The ventricles, sulci and basal cisterns are within normal limits. There is no extraaxial fluid  collection. Global volume loss with prominence of the temporal horns. Query dementia.   Parenchyma:  The grey-white differentiation is intact. There is no mass effect or midline shift.  There is no intracranial hemorrhage.   Calvarium: The calvarium is unremarkable.   Paranasal sinuses and mastoids: Visualized paranasal sinuses and mastoids are clear.       No evidence of acute cortical infarct or intracranial hemorrhage. Global volume loss.. Query any clinical evidence of dementia   MACRO: None   Signed by: Octavio Norwood 2/4/2025 10:48 PM Dictation workstation:   [id number]    XR elbow left 3+ views    Result Date: 2/4/2025  Interpreted By:  Pola Becker, STUDY: XR ELBOW LEFT 3+ VIEWS; ;  2/4/2025 9:45 pm   INDICATION: Signs/Symptoms:fall, bruising to left hip and elbow.     COMPARISON: None.   ACCESSION NUMBER(S): [id number]   ORDERING CLINICIAN: LATIA MEIER   FINDINGS: Left elbow, three views   Markedly limited study due to projection. Evaluation for joint effusion is limited. No definite fracture seen on the views. There is soft tissue edema about the elbow       Limited study due to projection. No definite fracture seen. Soft tissue edema about the elbow noted.     MACRO: None   Signed by: Pola Becker 2/4/2025 9:59 PM Dictation workstation:   [id number]    XR hip left with pelvis when performed 2 or 3 views    Result Date: 2/4/2025  Interpreted By:  Pola Becker, STUDY: XR HIP LEFT WITH PELVIS WHEN PERFORMED 2 OR 3 VIEWS; ;  2/4/2025 9:45 pm   INDICATION: Signs/Symptoms:fall, bruising to left hip and elbow.     COMPARISON: None.   ACCESSION NUMBER(S): [id number]   ORDERING CLINICIAN: LATIA MEIER   FINDINGS: Left hip, three views   No definite fracture seen. There is no dislocation. Mild osteophytosis present in the hips       No acute osseous abnormality seen radiographically.     MACRO: None   Signed by: Pola Becker 2/4/2025 9:58 PM Dictation workstation:   [id number]       Results for orders placed or performed during the hospital encounter of 02/04/25 (from the past 24 hours)   CBC and Auto Differential   Result Value Ref Range    WBC 12.3 (H) 4.4 - 11.3 x10*3/uL    nRBC 0.0 0.0 - 0.0 /100 WBCs    RBC 4.57 4.00 - 5.20 x10*6/uL    Hemoglobin 13.6 12.0 - 16.0 g/dL    Hematocrit 42.2 36.0 - 46.0 %    MCV 92 80 - 100 fL    MCH 29.8 26.0 - 34.0 pg    MCHC 32.2 32.0 - 36.0 g/dL    RDW 14.6 (H) 11.5 - 14.5 %    Platelets 341 150 - 450 x10*3/uL    Neutrophils % 80.1 40.0 - 80.0 %    Immature Granulocytes %, Automated 0.2 0.0 - 0.9 %    Lymphocytes % 8.4 13.0 - 44.0 %    Monocytes % 10.6 2.0 - 10.0 %    Eosinophils % 0.4 0.0 - 6.0 %    Basophils % 0.3 0.0 - 2.0 %    Neutrophils Absolute 9.86 (H) 1.60 - 5.50 x10*3/uL    Immature Granulocytes Absolute, Automated 0.03 0.00 - 0.50 x10*3/uL    Lymphocytes Absolute 1.03 0.80 - 3.00 x10*3/uL    Monocytes Absolute 1.30 (H) 0.05 - 0.80 x10*3/uL    Eosinophils Absolute 0.05 0.00 - 0.40 x10*3/uL    Basophils Absolute 0.04 0.00 - 0.10 x10*3/uL   Comprehensive metabolic panel   Result Value Ref Range    Glucose 105 (H) 74 - 99 mg/dL    Sodium 152 (H) 136 - 145 mmol/L    Potassium 4.7 3.5 - 5.3 mmol/L    Chloride 114 (H) 98 - 107 mmol/L    Bicarbonate 25 21 - 32 mmol/L    Anion Gap 18 10 - 20 mmol/L    Urea Nitrogen 21 6 - 23 mg/dL    Creatinine 0.73 0.50 - 1.05 mg/dL    eGFR 84 >60 mL/min/1.73m*2    Calcium 9.5 8.6 - 10.3 mg/dL    Albumin 4.0 3.4 - 5.0 g/dL    Alkaline Phosphatase 65 33 - 136 U/L    Total Protein 6.7 6.4 - 8.2 g/dL    AST 83 (H) 9 - 39 U/L    Bilirubin, Total 0.9 0.0 - 1.2 mg/dL    ALT 71 (H) 7 - 45 U/L   Creatine Kinase   Result Value Ref Range    Creatine Kinase 2,262 (H) 0 - 215 U/L   Sars-CoV-2 and Influenza A/B PCR   Result Value Ref Range    Flu A Result Not Detected Not Detected    Flu B Result Not Detected Not Detected    Coronavirus 2019, PCR Not Detected Not Detected   Troponin I, High Sensitivity, Initial   Result Value Ref  Range    Troponin I, High Sensitivity 166 (HH) 0 - 13 ng/L   Urinalysis with Reflex Culture and Microscopic   Result Value Ref Range    Color, Urine Yellow Light-Yellow, Yellow, Dark-Yellow    Appearance, Urine Clear Clear    Specific Gravity, Urine 1.031 1.005 - 1.035    pH, Urine 6.0 5.0, 5.5, 6.0, 6.5, 7.0, 7.5, 8.0    Protein, Urine 70 (1+) (A) NEGATIVE, 10 (TRACE), 20 (TRACE) mg/dL    Glucose, Urine Normal Normal mg/dL    Blood, Urine 0.03 (TRACE) (A) NEGATIVE mg/dL    Ketones, Urine OVER (4+) (A) NEGATIVE mg/dL    Bilirubin, Urine NEGATIVE NEGATIVE mg/dL    Urobilinogen, Urine 3 (1+) (A) Normal mg/dL    Nitrite, Urine NEGATIVE NEGATIVE    Leukocyte Esterase, Urine NEGATIVE NEGATIVE   Urinalysis Microscopic   Result Value Ref Range    WBC, Urine 6-10 (A) 1-5, NONE /HPF    RBC, Urine 1-2 NONE, 1-2, 3-5 /HPF    Bacteria, Urine 2+ (A) NONE SEEN /HPF    Mucus, Urine 4+ Reference range not established. /LPF    Hyaline Casts, Urine 1+ (A) NONE /LPF   Troponin, High Sensitivity, 1 Hour   Result Value Ref Range    Troponin I, High Sensitivity 163 (HH) 0 - 13 ng/L   Lactate   Result Value Ref Range    Lactate 1.4 0.4 - 2.0 mmol/L      EKG, reviewed myself: Sinus tachycardia with right bundle branch block and inverted T waves in lateral precordial leads.     Assessment/Plan   Assessment & Plan  Fall, initial encounter      Fall.  Physical Occupational Therapy.  Patient may need to go to rehab.  She tells me that she has been falling in the past.  Rhabdomyolysis.  IV hydration.  Monitor CK level  UTI.  IV Rocephin.  Follow culture  Abnormal LFTs.  On exam, there is no right upper quadrant tenderness.  Monitor LFTs, check right upper quadrant ultrasound.  Abnormal EKG.  Check echocardiogram, consult cardiologist.  Patient also has some elevation of troponin.  She denies chest pain or shortness of breath.  Elevation of troponin may be related to rhabdomyolysis.  DVT prophylaxis.  Lovenox  Dehydration.  Patient has  significant hypernatremia.  Will change IV fluids to dextrose only.         Lennie Campoverde MD

## 2025-02-05 NOTE — PROGRESS NOTES
02/05/25 1148   [facility] Disability Status   Are you deaf or do you have serious difficulty hearing? N   Are you blind or do you have serious difficulty seeing, even when wearing glasses? N   Because of a physical, mental, or emotional condition, do you have serious difficulty concentrating, remembering, or making decisions? (5 years old or older) N   Do you have serious difficulty walking or climbing stairs? N   Do you have serious difficulty dressing or bathing? N   Because of a physical, mental, or emotional condition, do you have serious difficulty doing errands alone such as visiting the doctor? N

## 2025-02-05 NOTE — PROGRESS NOTES
02/05/25 1145   Physical Activity   On average, how many days per week do you engage in moderate to strenuous exercise (like a brisk walk)? 0 days   On average, how many minutes do you engage in exercise at this level? 0 min   Financial Resource Strain   How hard is it for you to pay for the very basics like food, housing, medical care, and heating? Not hard   Housing Stability   In the last 12 months, was there a time when you were not able to pay the mortgage or rent on time? N   In the past 12 months, how many times have you moved where you were living? 0   At any time in the past 12 months, were you homeless or living in a shelter (including now)? N   Transportation Needs   In the past 12 months, has lack of transportation kept you from medical appointments or from getting medications? no   In the past 12 months, has lack of transportation kept you from meetings, work, or from getting things needed for daily living? No   Food Insecurity   Within the past 12 months, you worried that your food would run out before you got the money to buy more. Never true   Within the past 12 months, the food you bought just didn't last and you didn't have money to get more. Never true   Stress   Do you feel stress - tense, restless, nervous, or anxious, or unable to sleep at night because your mind is troubled all the time - these days? Only a littl   Social Connections   In a typical week, how many times do you talk on the phone with family, friends, or neighbors? Never   How often do you get together with friends or relatives? Twice   How often do you attend Lutheran or Taoist services? Never   Do you belong to any clubs or organizations such as Lutheran groups, unions, fraternal or athletic groups, or school groups? No   How often do you attend meetings of the clubs or organizations you belong to? Never   Are you , , , , never , or living with a partner? Never marrie   Intimate Partner  Violence   Within the last year, have you been afraid of your partner or ex-partner? No   Within the last year, have you been humiliated or emotionally abused in other ways by your partner or ex-partner? No   Within the last year, have you been kicked, hit, slapped, or otherwise physically hurt by your partner or ex-partner? No   Within the last year, have you been raped or forced to have any kind of sexual activity by your partner or ex-partner? No   Alcohol Use   Q1: How often do you have a drink containing alcohol? Never   Q2: How many drinks containing alcohol do you have on a typical day when you are drinking? None   Q3: How often do you have six or more drinks on one occasion? Never   Utilities   In the past 12 months has the electric, gas, oil, or water company threatened to shut off services in your home? No   Health Literacy   How often do you need to have someone help you when you read instructions, pamphlets, or other written material from your doctor or pharmacy? Never  (pt said that she is able to pay her own bills)

## 2025-02-05 NOTE — NURSING NOTE
Attempted to get vitals and give patient a bath case management was in to see patient. Will come back soon. RN notified.

## 2025-02-05 NOTE — ED TRIAGE NOTES
Patient arrived to ED via EMS from home with c/o fall ealier in the day. Patient was last seen around 1500 and was then found on the ground around 1800 by her brother when he returned home. Patient was covered in urine and feces and had bruises to her left hip/elbow. Patient is A&Ox2 at baseline but is able to recall everything asked in the ED. Patient denies hitting her head, denies LOC, denies being on blood thinners. Patient stable at this time.

## 2025-02-05 NOTE — ED PROVIDER NOTES
HPI   Chief Complaint   Patient presents with    Fall       HPI    Patient is a 79-year-old female presenting from home via EMS after her son found the patient down on the ground.  According to EMS, son had last seen the patient at approximately 3 PM at her home.  Just prior to EMS arrival, son went to check on the patient at her home and found her on the ground.  On questioning, the patient states that she does remember falling.  She states that she was attempting to get off the toilet when she lost her balance.  She is unsure if she hit her head or not.  According to EMS and son, patient is currently at her baseline.  She does live alone.  She denies any blood thinner use.  No complaints of pain.  No chest pain or shortness of breath.  No abdominal pain, nausea, vomiting, diarrhea or constipation.  No dysuria or hematuria.    Patient History   No past medical history on file.  No past surgical history on file.  No family history on file.  Social History     Tobacco Use    Smoking status: Not on file    Smokeless tobacco: Not on file   Substance Use Topics    Alcohol use: Not on file    Drug use: Not on file       Physical Exam   ED Triage Vitals [02/04/25 2010]   Temperature Heart Rate Respirations BP   36.4 °C (97.5 °F) (!) 109 19 150/72      Pulse Ox Temp Source Heart Rate Source Patient Position   97 % Oral Monitor --      BP Location FiO2 (%)     -- --       Physical Exam  Vitals and nursing note reviewed.   Constitutional:       Comments: Patient is alert and pleasant.  She does appear disheveled and dirty.   HENT:      Head: Normocephalic and atraumatic.   Eyes:      Extraocular Movements: Extraocular movements intact.      Conjunctiva/sclera: Conjunctivae normal.      Pupils: Pupils are equal, round, and reactive to light.   Neck:      Comments: No C-spine tenderness, palpable bony deformities or step-offs.  Cardiovascular:      Rate and Rhythm: Normal rate and regular rhythm.   Pulmonary:      Effort:  Pulmonary effort is normal.      Breath sounds: Normal breath sounds.   Abdominal:      General: Abdomen is flat. Bowel sounds are normal.      Palpations: Abdomen is soft.   Musculoskeletal:      Cervical back: Normal range of motion and neck supple.      Comments: Evidence of bruising to the left elbow and left lateral hip.  Nontender to touch.  No palpable chest crepitus or evidence of flail chest.  Hips are stable.  No evidence of leg shortening or abnormal rotation.   Neurological:      Mental Status: She is alert.           ED Course & MDM   ED Course as of 02/04/25 2338 Tue Feb 04, 2025 2102 EKG on my interpretation shows sinus tachycardia with rate of 101 beats minute.  Normal axis.  QTc 544 ms, NE interval 206.  Right bundle branch block present.  No ST elevation or depression, no acute ischemic pattern.  No STEMI  [NT]   2106 Bacteria, Urine(!): 2+ [NT]   2106 WBC, Urine(!): 6-10 [NT]   2106 WBC(!): 12.3 [NT]   2106 Bacteria and white blood cells present on straight cath urine specimen concerning for UTI.  Treated with Rocephin. [NT]   2126 Creatine Kinase(!): 2,262 [NT]      ED Course User Index  [NT] Brandan Matute,          Diagnoses as of 02/04/25 2338   Fall, initial encounter   Non-traumatic rhabdomyolysis   Acute urinary tract infection                 No data recorded     [place] Coma Scale Score: 15 (02/04/25 2034 : Caitlyn Anne RN)                           Medical Decision Making    Parts of this chart have been completed using voice recognition software. Please excuse any errors of transcription. Despite the medical decision making time stamp above-my medical decision making has taken place during the patient's entire visit. My thought process and reason for plan has been formulated from the time that I saw the patient until the time of disposition and is not specific to one specific moment during their visit and furthermore my MDM encompasses this entire chart and not only this  text box.      HPI: Detailed above.    Exam: A medically appropriate exam performed, outlined above, given the known history and presentation.    History obtained from: Patient, EMS    Social Determinants of Health considered during this visit: Lives at home    EKG interpreted by my attending physician, reviewed by myself.    Labs Reviewed   CBC WITH AUTO DIFFERENTIAL - Abnormal       Result Value    WBC 12.3 (*)     nRBC 0.0      RBC 4.57      Hemoglobin 13.6      Hematocrit 42.2      MCV 92      MCH 29.8      MCHC 32.2      RDW 14.6 (*)     Platelets 341      Neutrophils % 80.1      Immature Granulocytes %, Automated 0.2      Lymphocytes % 8.4      Monocytes % 10.6      Eosinophils % 0.4      Basophils % 0.3      Neutrophils Absolute 9.86 (*)     Immature Granulocytes Absolute, Automated 0.03      Lymphocytes Absolute 1.03      Monocytes Absolute 1.30 (*)     Eosinophils Absolute 0.05      Basophils Absolute 0.04     COMPREHENSIVE METABOLIC PANEL - Abnormal    Glucose 105 (*)     Sodium 152 (*)     Potassium 4.7      Chloride 114 (*)     Bicarbonate 25      Anion Gap 18      Urea Nitrogen 21      Creatinine 0.73      eGFR 84      Calcium 9.5      Albumin 4.0      Alkaline Phosphatase 65      Total Protein 6.7      AST 83 (*)     Bilirubin, Total 0.9      ALT 71 (*)    CREATINE KINASE - Abnormal    Creatine Kinase 2,262 (*)    URINALYSIS WITH REFLEX CULTURE AND MICROSCOPIC - Abnormal    Color, Urine Yellow      Appearance, Urine Clear      Specific Gravity, Urine 1.031      pH, Urine 6.0      Protein, Urine 70 (1+) (*)     Glucose, Urine Normal      Blood, Urine 0.03 (TRACE) (*)     Ketones, Urine OVER (4+) (*)     Bilirubin, Urine NEGATIVE      Urobilinogen, Urine 3 (1+) (*)     Nitrite, Urine NEGATIVE      Leukocyte Esterase, Urine NEGATIVE      Narrative:     OVER is reported when the result is greater than the clinically reportable range.   SERIAL TROPONIN-INITIAL - Abnormal    Troponin I, High Sensitivity 166  (*)     Narrative:     Less than 99th percentile of normal range cutoff-  Female and children under 18 years old <14 ng/L; Male <21 ng/L: Negative  Repeat testing should be performed if clinically indicated.     Female and children under 18 years old 14-50 ng/L; Male 21-50 ng/L:  Consistent with possible cardiac damage and possible increased clinical   risk. Serial measurements may help to assess extent of myocardial damage.     >50 ng/L: Consistent with cardiac damage, increased clinical risk and  myocardial infarction. Serial measurements may help assess extent of   myocardial damage.      NOTE: Children less than 1 year old may have higher baseline troponin   levels and results should be interpreted in conjunction with the overall   clinical context.     NOTE: Troponin I testing is performed using a different   testing methodology at [facility] than at other   [facility]. Direct result comparisons should only   be made within the same method.   SERIAL TROPONIN, 1 HOUR - Abnormal    Troponin I, High Sensitivity 163 (*)     Narrative:     Less than 99th percentile of normal range cutoff-  Female and children under 18 years old <14 ng/L; Male <21 ng/L: Negative  Repeat testing should be performed if clinically indicated.     Female and children under 18 years old 14-50 ng/L; Male 21-50 ng/L:  Consistent with possible cardiac damage and possible increased clinical   risk. Serial measurements may help to assess extent of myocardial damage.     >50 ng/L: Consistent with cardiac damage, increased clinical risk and  myocardial infarction. Serial measurements may help assess extent of   myocardial damage.      NOTE: Children less than 1 year old may have higher baseline troponin   levels and results should be interpreted in conjunction with the overall   clinical context.     NOTE: Troponin I testing is performed using a different   testing methodology at [facility] than at other   [facility]. Direct result comparisons should only   be made within the same method.   URINALYSIS MICROSCOPIC WITH REFLEX CULTURE - Abnormal    WBC, Urine 6-10 (*)     RBC, Urine 1-2      Bacteria, Urine 2+ (*)     Mucus, Urine 4+      Hyaline Casts, Urine 1+ (*)    SARS-COV-2 AND INFLUENZA A/B PCR - Normal    Flu A Result Not Detected      Flu B Result Not Detected      Coronavirus 2019, PCR Not Detected      Narrative:     This assay is an FDA-cleared, in vitro diagnostic nucleic acid amplification test for the qualitative detection and differentiation of SARS CoV-2/ Influenza A/B from nasopharyngeal specimens collected from individuals with signs and symptoms of respiratory tract infections, and has been validated for use at [facility]. Negative results do not preclude COVID-19/ Influenza A/B infections and should not be used as the sole basis for diagnosis, treatment, or other management decisions. Testing for SARS CoV-2 is recommended only for patients who meet current clinical and/or epidemiological criteria defined by federal, state, or local public health directives.   LACTATE - Normal    Lactate 1.4      Narrative:     Venipuncture immediately after or during the administration of Metamizole may lead to falsely low results. Testing should be performed immediately prior to Metamizole dosing.   BLOOD CULTURE   BLOOD CULTURE   TROPONIN SERIES- (INITIAL, 1 HR)    Narrative:     The following orders were created for panel order Troponin Series, (0, 1 HR).  Procedure                               Abnormality         Status                     ---------                               -----------         ------                     Troponin I, High Sensiti...[[id number]]  Abnormal            Final result               Troponin, High Sensitivi...[[id number]]  Abnormal            Final result                 Please view results for these tests on the individual orders.   URINALYSIS WITH REFLEX  CULTURE AND MICROSCOPIC    Narrative:     The following orders were created for panel order Urinalysis with Reflex Culture and Microscopic.  Procedure                               Abnormality         Status                     ---------                               -----------         ------                     Urinalysis with Reflex C...[[id number]]  Abnormal            Final result               Extra Urine Gray Tube[[id number]]                            In process                   Please view results for these tests on the individual orders.   EXTRA URINE GRAY TUBE     CT head wo IV contrast   Final Result   No evidence of acute cortical infarct or intracranial hemorrhage.   Global volume loss.. Query any clinical evidence of dementia        MACRO:   None        Signed by: Octavio Norwood 2/4/2025 10:48 PM   Dictation workstation:   [id number]      XR hip left with pelvis when performed 2 or 3 views   Final Result   No acute osseous abnormality seen radiographically.             MACRO:   None        Signed by: Pola Becker 2/4/2025 9:58 PM   Dictation workstation:   [id number]      XR elbow left 3+ views   Final Result   Limited study due to projection. No definite fracture seen. Soft   tissue edema about the elbow noted.             MACRO:   None        Signed by: Pola Becker 2/4/2025 9:59 PM   Dictation workstation:   [id number]        Medications   sodium chloride 0.9 % bolus 1,000 mL (0 mL intravenous Stopped 2/4/25 3377)   cefTRIAXone (Rocephin) 1 g in dextrose (iso) IV 50 mL (1 g intravenous New Bag 2/4/25 3371)     Differential diagnoses considered for this visit include: Electrolyte imbalance versus metabolic disturbance versus acute intracranial processes versus viral illness versus acute fracture    Considerations/further MDM:    Patient is a 79-year-old female coming from home via EMS for evaluation after being found down by her son after a fall.  Vital signs appreciated mild  tachycardia with heart rate of 109 bpm.  Remainder vital signs are stable.  Patient is pleasant and cooperative on exam.  According to EMS report, patient is at her baseline.  Patient does smell of urine and appears disheveled.  There is bruising to the left elbow and left hip.  No obvious signs of fracture or deformity.  Workup pursued with labs, CT head without contrast, x-ray of the left elbow and x-ray of the left hip.    CBC shows a white count of 12.3.  No anemia.  CMP demonstrates hypernatremia sodium of 152.  Urinalysis is concerning for possible UTI with bacteria and blood cells present on straight cath.  Treated with Rocephin.  Viral testing was negative.  CK was elevated at 2262, concerning for rhabdomyolysis.  Troponin was elevated at 166, likely suspected due to rhabdomyolysis.  Repeat was 163.  X-ray of the left elbow shows no definite fracture with soft tissue edema.  X-ray of the left hip with pelvis shows no acute osseous abnormality. CT head shows no evidence of acute cortical infarct or intracranial hemorrhage with global volume loss.  Given patient's presence of rhabdomyolysis and UTI, admission was recommended.  Patient was agreeable.  I consulted with the on-call hospitalist, and the patient was admitted to the regular nursing floor in good condition.    The patient/family was counseled on clinical impression, expectations, and plan along with recommendations to admission. All questions were answered and involved parties were understanding and agreeable to course of treatment. Case was discussed with admitting physician and any consultants. Bed type, ED treatment and further ED workup decided by joint decision making with admitting team and any consultants. Patient stable for admission per my assessment and further management of patient will be deferred to the inpatient setting.    Patient was seen in conjunction with attending physician Dr. Brandan Matute.   Patient's history, physical  exam, diagnostic studies, and treatment plan were discussed thoroughly.  Procedure  Procedures     Silvia Sunshine PA-C  02/04/25 1794

## 2025-02-05 NOTE — CONSULTS
Inpatient consult to Cardiology  Consult performed by: Mamta Bach, [place]-CNP  Consult ordered by: Lennie Campoverde MD  Reason for consult: Abnormal EKG          Reason For Consult  Abnormal EKG    History Of Present Illness  Shirley Grajeda is a 79 y.o. female presented to the emergency department after a fall at her home.  She states that she lives at home alone and uses a walker to ambulate around the home.  She states that she was trying to get off the toilet when she fell.  Her son, who regularly checks in on her, found her on the floor and called EMS to bring her to the ED. patient states that she has fallen multiple times, but is able to recover and get up on her own.  She denies losing consciousness or having feelings of dizziness and her past falls.  Patient denies chest pain, palpitations, pressure.  Patient does not regularly see a physician.  She denies taking any medications.  Patient does not have a cardiologist.  Patient has a past medical history of Fuchs' corneal dystrophy and posterior vitreous detachment of both eyes, in which she was treated at the [facility], and hyperlipidemia.  According to her record through the [facility], the patient at some point was taking atorvastatin for her hyperlipidemia.  She denies taking this medication at this time. The patient is also an everyday smoker.  Labs in the ED included a sodium 150, potassium 2.5, BUN 19, creatinine 0.61, ALT 55, AST 53, creatine kinase 1175, troponin 166, 163, WBC 11.0, hemoglobin/hematocrit 12.5/39.4.  Head CT was obtained in the ED and showed no evidence of acute cortical infarct or intracranial hemorrhage.  X-ray of her left elbow was obtained, no definite fracture seen. Soft tissue edema about the elbow noted.  X-ray of the left hip was also taken and showed no definite fracture seen. There is no dislocation. Mild osteophytosis present in the hips.  EKG obtained was abnormal, showing wide QRS complexes in V1,  "V2; ST depression in V3, V4.  Patient does not have previous EKG for comparison. In the ED patient was given a 1 L bolus of sodium chloride, and started on a dextrose 5% infusion for dehydration.  She was also given a dose of ceftriaxone for positive urinalysis.     Past Medical History  She has no past medical history on file.    Surgical History  She has no past surgical history on file.     Social History  She has no history on file for tobacco use, alcohol use, and drug use.    Family History  No family history on file.     Allergies  Penicillins     Physical Exam  Vitals and nursing note reviewed.   HENT:      Head: Normocephalic and atraumatic.      Mouth/Throat:      Mouth: Mucous membranes are dry.      Pharynx: Oropharynx is clear.   Eyes:      Extraocular Movements: Extraocular movements intact.   Cardiovascular:      Heart sounds: No murmur heard.     No friction rub. No gallop.   Pulmonary:      Effort: Pulmonary effort is normal.      Breath sounds: Normal breath sounds. No stridor. No wheezing or rhonchi.      Comments: Patient on room air.  All lobes are clear.  Abdominal:      General: Bowel sounds are normal.      Palpations: Abdomen is soft.   Musculoskeletal:         General: No swelling.      Cervical back: Normal range of motion and neck supple.   Skin:     General: Skin is warm and dry.      Capillary Refill: Capillary refill takes less than 2 seconds.   Neurological:      Mental Status: She is alert.   Psychiatric:      Comments: Patient is oriented x2-3, but seems to have some memory impairment           Last Recorded Vitals  Blood pressure (!) 123/94, pulse 84, temperature 36.4 °C (97.5 °F), temperature source Oral, resp. rate 16, height 1.575 m (5' 2\"), weight 68 kg (150 lb), SpO2 97%.    Results for orders placed or performed during the hospital encounter of 02/04/25 (from the past 24 hours)   CBC and Auto Differential   Result Value Ref Range    WBC 12.3 (H) 4.4 - 11.3 x10*3/uL    nRBC " 0.0 0.0 - 0.0 /100 WBCs    RBC 4.57 4.00 - 5.20 x10*6/uL    Hemoglobin 13.6 12.0 - 16.0 g/dL    Hematocrit 42.2 36.0 - 46.0 %    MCV 92 80 - 100 fL    MCH 29.8 26.0 - 34.0 pg    MCHC 32.2 32.0 - 36.0 g/dL    RDW 14.6 (H) 11.5 - 14.5 %    Platelets 341 150 - 450 x10*3/uL    Neutrophils % 80.1 40.0 - 80.0 %    Immature Granulocytes %, Automated 0.2 0.0 - 0.9 %    Lymphocytes % 8.4 13.0 - 44.0 %    Monocytes % 10.6 2.0 - 10.0 %    Eosinophils % 0.4 0.0 - 6.0 %    Basophils % 0.3 0.0 - 2.0 %    Neutrophils Absolute 9.86 (H) 1.60 - 5.50 x10*3/uL    Immature Granulocytes Absolute, Automated 0.03 0.00 - 0.50 x10*3/uL    Lymphocytes Absolute 1.03 0.80 - 3.00 x10*3/uL    Monocytes Absolute 1.30 (H) 0.05 - 0.80 x10*3/uL    Eosinophils Absolute 0.05 0.00 - 0.40 x10*3/uL    Basophils Absolute 0.04 0.00 - 0.10 x10*3/uL   Comprehensive metabolic panel   Result Value Ref Range    Glucose 105 (H) 74 - 99 mg/dL    Sodium 152 (H) 136 - 145 mmol/L    Potassium 4.7 3.5 - 5.3 mmol/L    Chloride 114 (H) 98 - 107 mmol/L    Bicarbonate 25 21 - 32 mmol/L    Anion Gap 18 10 - 20 mmol/L    Urea Nitrogen 21 6 - 23 mg/dL    Creatinine 0.73 0.50 - 1.05 mg/dL    eGFR 84 >60 mL/min/1.73m*2    Calcium 9.5 8.6 - 10.3 mg/dL    Albumin 4.0 3.4 - 5.0 g/dL    Alkaline Phosphatase 65 33 - 136 U/L    Total Protein 6.7 6.4 - 8.2 g/dL    AST 83 (H) 9 - 39 U/L    Bilirubin, Total 0.9 0.0 - 1.2 mg/dL    ALT 71 (H) 7 - 45 U/L   Creatine Kinase   Result Value Ref Range    Creatine Kinase 2,262 (H) 0 - 215 U/L   Sars-CoV-2 and Influenza A/B PCR   Result Value Ref Range    Flu A Result Not Detected Not Detected    Flu B Result Not Detected Not Detected    Coronavirus 2019, PCR Not Detected Not Detected   Troponin I, High Sensitivity, Initial   Result Value Ref Range    Troponin I, High Sensitivity 166 (HH) 0 - 13 ng/L   Urinalysis with Reflex Culture and Microscopic   Result Value Ref Range    Color, Urine Yellow Light-Yellow, Yellow, Dark-Yellow    Appearance,  Urine Clear Clear    Specific Gravity, Urine 1.031 1.005 - 1.035    pH, Urine 6.0 5.0, 5.5, 6.0, 6.5, 7.0, 7.5, 8.0    Protein, Urine 70 (1+) (A) NEGATIVE, 10 (TRACE), 20 (TRACE) mg/dL    Glucose, Urine Normal Normal mg/dL    Blood, Urine 0.03 (TRACE) (A) NEGATIVE mg/dL    Ketones, Urine OVER (4+) (A) NEGATIVE mg/dL    Bilirubin, Urine NEGATIVE NEGATIVE mg/dL    Urobilinogen, Urine 3 (1+) (A) Normal mg/dL    Nitrite, Urine NEGATIVE NEGATIVE    Leukocyte Esterase, Urine NEGATIVE NEGATIVE   Extra Urine Gray Tube   Result Value Ref Range    Extra Tube Hold for add-ons.    Urinalysis Microscopic   Result Value Ref Range    WBC, Urine 6-10 (A) 1-5, NONE /HPF    RBC, Urine 1-2 NONE, 1-2, 3-5 /HPF    Bacteria, Urine 2+ (A) NONE SEEN /HPF    Mucus, Urine 4+ Reference range not established. /LPF    Hyaline Casts, Urine 1+ (A) NONE /LPF   ECG 12 lead   Result Value Ref Range    Ventricular Rate 101 BPM    Atrial Rate 101 BPM    NH Interval 206 ms    QRS Duration 134 ms    QT Interval 420 ms    QTC Calculation(Bazett) 544 ms    R Axis -4 degrees    T Axis 3 degrees    QRS Count 16 beats    Q Onset 215 ms    P Onset 112 ms    P Offset 186 ms    T Offset 425 ms    QTC Fredericia 499 ms   Troponin, High Sensitivity, 1 Hour   Result Value Ref Range    Troponin I, High Sensitivity 163 (HH) 0 - 13 ng/L   Lactate   Result Value Ref Range    Lactate 1.4 0.4 - 2.0 mmol/L   Thyroid Stimulating Hormone   Result Value Ref Range    Thyroid Stimulating Hormone 1.45 0.44 - 3.98 mIU/L   CBC   Result Value Ref Range    WBC 11.0 4.4 - 11.3 x10*3/uL    nRBC 0.0 0.0 - 0.0 /100 WBCs    RBC 4.24 4.00 - 5.20 x10*6/uL    Hemoglobin 12.5 12.0 - 16.0 g/dL    Hematocrit 39.4 36.0 - 46.0 %    MCV 93 80 - 100 fL    MCH 29.5 26.0 - 34.0 pg    MCHC 31.7 (L) 32.0 - 36.0 g/dL    RDW 14.6 (H) 11.5 - 14.5 %    Platelets 319 150 - 450 x10*3/uL   Creatine Kinase   Result Value Ref Range    Creatine Kinase 1,175 (H) 0 - 215 U/L         Assessment/Plan     Abnormal  EKG  Fall  Rhabdomyolysis   Abnormal LFTs   Hyperlipidemia      2/5: As stated above. Patient had an abnormal EKG in the ED. No previous EKG available for comparison.  Patient has a wide QRS with an ST abnormality.  No definitive ST elevation with ischemic changes. Will order another EKG to be completed this morning for comparison.  Primary team ordered transthoracic echocardiogram which will be completed today.  Patient denies chest pain, palpitations, pressure.  Denies shortness of breath or dizziness.  This morning's vitals show a heart rate of 84, blood pressure 123/94, satting 97% on room air.  Patient does not have any swelling in any of her extremities at this time, she is on a continuous IV infusion. Elevated troponins noted are from rhabdomyolysis. Upon the results of the echocardiogram, we can estimate hospital discharge in 1 to 2 days.  With the patient having a significant smoking history we suggest calcium scoring and a nuclear stress test to be completed in the outpatient setting.  Patient should follow-up with cardiologist Dr. Mcpherson in the outpatient settings in 2 to 3 weeks.     I spent 60 minutes in the professional and overall care of this patient.

## 2025-02-05 NOTE — PROGRESS NOTES
Occupational Therapy  Evaluation     Patient Name: Shirley Grajeda  MRN: [id number]  : 1945  Today's Date: 25  Time Calculation  Start Time: 1610  Stop Time: 1627  Time Calculation (min): 17 min       Assessment:  OT Assessment: Pt demonstrates generalized weakness and deconditioning impacting safety and independence with functional tasks. OT to address ADLs, transfers, balance, strength, d/c plnning. Recommend d/c to Mod Intensity  Prognosis: Good  Barriers to Discharge Home: Caregiver assistance, Cognition needs, Physical needs  Caregiver Assistance: Patient lives alone and/or does not have reliable caregiver assistance  Cognition Needs: Insight of patient limited regarding functional ability/needs  Physical Needs: 24hr ADL assistance needed, 24hr mobility assistance needed, Ambulating household distances limited by function/safety  Evaluation/Treatment Tolerance: Patient tolerated treatment well  Medical Staff Made Aware: Yes  End of Session Communication: Bedside nurse  End of Session Patient Position: Bed, 3 rail up, Alarm off, not on at start of session  OT Assessment Results: Decreased ADL status, Decreased upper extremity range of motion, Decreased upper extremity strength, Decreased safe judgment during ADL, Decreased cognition, Decreased endurance, Decreased functional mobility  Prognosis: Good  Evaluation/Treatment Tolerance: Patient tolerated treatment well  Medical Staff Made Aware: Yes  Barriers to Participation: Comorbidities  Plan:  Treatment Interventions: ADL retraining, Functional transfer training, UE strengthening/ROM, Endurance training, Cognitive reorientation, Patient/family training, Equipment evaluation/education, Compensatory technique education  OT Frequency: 3 times per week  OT Discharge Recommendations: Moderate intensity level of continued care  OT Recommended Transfer Status: Minimal assist, Moderate assist, Assist of 1  OT - OK to Discharge: Yes  Treatment Interventions:  ADL retraining, Functional transfer training, UE strengthening/ROM, Endurance training, Cognitive reorientation, Patient/family training, Equipment evaluation/education, Compensatory technique education    Subjective   Current Problem:  1. Fall, initial encounter        2. Non-traumatic rhabdomyolysis        3. Acute urinary tract infection        4. Abnormal EKG  Transthoracic Echo (TTE) Complete    Transthoracic Echo (TTE) Complete        General:   OT Received On: 02/05/25  General  Reason for Referral: Impaired ADLs  Referred By: Gilles  Prior to Session Communication: Bedside nurse  Patient Position Received: Bed, 3 rail up, Alarm off, not on at start of session  General Comment: 79yoF no PMH presenting from home with fall, (+)UTI  Precautions:  Medical Precautions: Fall precautions     Date/Time Vitals Session Patient Position Pulse Resp SpO2 BP MAP (mmHg)    02/05/25 1610 --  --  81  --  --  --  --     02/05/25 1626 --  --  87  18  95 %  141/68  86                 Pain:  Pain Assessment  Pain Assessment: 0-10  0-10 (Numeric) Pain Score: 0 - No pain    Objective   Cognition:  Overall Cognitive Status: Impaired at baseline  Orientation Level: Oriented X4  Safety/Judgement: Exceptions to WFL  Insight: Moderate             Home Living:  Type of Home: Condo  Lives With: Alone  Home Adaptive Equipment: Walker rolling or standard, Cane  Home Layout: One level  Home Access: Stairs to enter without rails  Entrance Stairs-Number of Steps: 1  Bathroom Shower/Tub: Tub/shower unit    Prior Function:  Level of [place]: Independent with ADLs and functional transfers, Independent with homemaking with ambulation  Receives Help From: Family (Nieces and Nephews)  ADL Assistance: Independent  Homemaking Assistance: Independent  Ambulatory Assistance: Independent     ADL:  Grooming Assistance: Moderate  Grooming Deficit: Brushing hair  LE Dressing Assistance: Maximal  LE Dressing Deficit: Don/doff R sock, Don/doff L  "sock       Activity Tolerance:  Endurance: Tolerates less than 10 min exercise, no significant change in vital signs     Bed Mobility/Transfers: Bed Mobility  Bed Mobility: Yes  Bed Mobility 1  Bed Mobility 1: Supine to sitting, Sitting to supine  Level of Assistance 1: Moderate assistance  Bed Mobility Comments 1: HOB elevated, cues for hand/foot placement    Sitting Balance:  Static Sitting Balance  Static Sitting-Balance Support:  (unilateral UE support)  Static Sitting-Level of Assistance: Contact guard, Close supervision  Static Sitting-Comment/Number of Minutes: Leaning onto L elbow d/t \"I'm afraid I will fall.\"  Dynamic Sitting Balance  Dynamic Sitting-Balance Support: No upper extremity supported  Dynamic Sitting-Level of Assistance: Contact guard     Coordination:  Coordination Comment: decreased rate      Extremities: RUE   RUE : Exceptions to WFL (generalized weakness; AROM WFL) and LUE   LUE: Exceptions to WFL (Generalized weakness; L AROM impaired at shoulder and hand)    Outcome Measures: [facility] Daily Activity  Putting on and taking off regular lower body clothing: Total  Bathing (including washing, rinsing, drying): A lot  Putting on and taking off regular upper body clothing: A lot  Toileting, which includes using toilet, bedpan or urinal: A lot  Taking care of personal grooming such as brushing teeth: A lot  Eating Meals: A lot  Daily Activity - Total Score: 11        Education Documentation  ADL Training, taught by Randee Cortes OT at 2/5/2025  4:37 PM.  Learner: Patient  Readiness: Acceptance  Method: Explanation  Response: Needs Reinforcement  Comment: Educated on safety at EOB, bed mobility, purpose of OT    Education Comments  No comments found.          Goals:  Encounter Problems       Encounter Problems (Active)       Bathing       LTG - Patient will utilize adaptive techniques to bathe body Min A       Start:  02/05/25    Expected End:  02/19/25               Dressing Upper Extremities  "      LTG - Patient will utilize adaptive techniques/equipment to dress upper body SBA       Start:  02/05/25    Expected End:  02/19/25               Dressings Lower Extremities       LTG - Patient will utilize adaptive techniques/equipment to dress lower body Min A       Start:  02/05/25    Expected End:  02/19/25               Eating       LTG - Patient will feed self Independent       Start:  02/05/25    Expected End:  02/19/25               Functional Balance       LTG - Patient will maintain balance SBA to complete ADLs       Start:  02/05/25    Expected End:  02/19/25               Grooming       LTG - Patient will utilize adaptive techniques/equipment to complete daily grooming activities Independent       Start:  02/05/25    Expected End:  02/19/25               Toileting       LTG - Patient will utilize adaptive techniques/equipment to complete daily toileting tasks Min A       Start:  02/05/25    Expected End:  02/19/25

## 2025-02-05 NOTE — CONSULTS
"Nutrition Assessement Note    Nutrition Assessment    Reason for Assessment: Admission nursing screening (MST 2)    Reason for Hospital Admission:  Shirley Grajeda is a 79 y.o. female who is admitted for fall. presenting from home via EMS after her son found the patient down on the ground. Spoke with pt at bedside, pt reported good PO intake and denies weight changes. Will monitor PO intake.     Malnutrition Screening Tool (MST)  Have you recently lost weight without trying?: Unsure  If yes, how much weight have you lost?: Unsure  Weight Loss Score: 2  Have you been eating poorly because of a decreased appetite?: No  Malnutrition Score: 2  Nutrition Screen  Stage 3 or 4 Pressure Injury or Multiple Non-Healing Wounds: No  Home Tube Feeding or Total Parenteral Nutrition (TPN): No  Dietitian Consult Needed: No    History reviewed. No pertinent past medical history.   History reviewed. No pertinent surgical history.    Nutrition History:  Food and Nutrient History: Pt reported good PO intake        Food Allergies/Intolerances:  None    Anthropometrics:  Ht: 157.5 cm (5' 2\"), Wt: 68 kg (150 lb), BMI: 27.43             Weight Change:  Daily Weight  02/04/25 : 68 kg (150 lb)     Weight History / % Weight Change: Pt reported UBW of 125#. Bed scale weighed pt in at 142.5lbs. Pt reported that her legs are swollen             Nutrition Focused Physical Exam Findings:                       Nutrition Significant Labs:  Lab Results   Component Value Date    WBC 11.0 02/05/2025    HGB 12.5 02/05/2025    HCT 39.4 02/05/2025     02/05/2025    CHOL 240 (H) 02/05/2025    TRIG 118 02/05/2025    HDL 43.9 02/05/2025    ALT 55 (H) 02/05/2025    AST 53 (H) 02/05/2025     (H) 02/05/2025    K 2.9 (LL) 02/05/2025     (H) 02/05/2025    CREATININE 0.61 02/05/2025    BUN 19 02/05/2025    CO2 27 02/05/2025    TSH 1.45 02/05/2025     Nutrition Specific Medications:  enoxaparin, 40 mg, subcutaneous, Daily      dextrose 5%, 125 " mL/hr, Last Rate: 125 mL/hr (02/05/25 1052)        Dietary Orders (From admission, onward)       Start     Ordered    02/05/25 1243  May Participate in Room Service With Assistance  ( ROOM SERVICE MAY PARTICIPATE WITH ASSISTANCE)  Once        Question:  .  Answer:  Yes    02/05/25 1242    02/04/25 2342  Adult diet Regular  Diet effective now        Question:  Diet type  Answer:  Regular    02/04/25 2342                     Estimated Needs:   Estimated Energy Needs  Total Energy Estimated Needs in 24 hours (kCal): 1700 kCal  Energy Estimated Needs per kg Body Weight in 24 hours (kCal/kg): 25 kCal/kg  Method for Estimating Needs: actual wt    Estimated Protein Needs  Total Protein Estimated Needs in 24 Hours (g): 68 g  Protein Estimated Needs per kg Body Weight in 24 Hours (g/kg): 1 g/kg  Method for Estimating 24 Hour Protein Needs: actual wt    Estimated Fluid Needs  Total Fluid Estimated Needs in 24 Hours (mL): 1700 mL  Method for Estimating 24 Hour Fluid Needs: 1 mL/kcal        Nutrition Diagnosis   Nutrition Diagnosis:       Nutrition Diagnosis  Patient has Nutrition Diagnosis: No       Nutrition Interventions/Recommendations   Nutrition Interventions and Recommendations:  Nutrition Prescription: Nutrition prescription for oral nutrition    Nutrition Recommendations:  Individualized Nutrition Prescription Provided for : 1700 kcals, 68 g protein via diet    Nutrition Interventions/Goals:   Food and/or Nutrient Delivery Interventions  Interventions: Meals and snacks  Meals and Snacks: General healthful diet  Goal: provide diet as ordered    Education Documentation  No documentation found.           Nutrition Monitoring and Evaluation   Monitoring/Evaluation:   Food/Nutrient Related History Monitoring  Monitoring and Evaluation Plan: Estimated Energy Intake  Estimated Energy Intake: Energy intake greater or equal to 75% of estimated energy needs                        Goal Status: New goal(s) identified        Follow Up  Time Spent (min): 30 minutes  Last Date of Nutrition Visit: 02/05/25  Nutrition Follow-Up Needed?: 7-10 days  Follow up Comment: 2/12/25

## 2025-02-05 NOTE — PROGRESS NOTES
Spiritual Care Visit  Spiritual Care Request    Reason for Visit:  Routine Visit: Introduction     Request Received From:       Focus of Care:  Visited With: Patient         Refer to :          Spiritual Care Assessment    Spiritual Assessment:                      Care Provided:       Sense of Community and or Zoroastrianism Affiliation:  Congregational   Values/Beliefs  Spiritual Requests During Hospitalization: Mala asked for a blessing. No sacrmaents     Addressed Needs/Concerns and/or Sarah Through:     Sacramental Encounters  Communion: Does not want communion  Communion Given Indicator: No  Sacrament of Sick-Anointing: Patient declined anointing    Outcome:        Advance Directives:         Spiritual Care Annotation    Annotation:  Yanet asked for a blessing. No sacraments from the Zoroastrian.  Rolando Alcantar

## 2025-02-05 NOTE — PROGRESS NOTES
02/05/25 1146   Discharge Planning   Living Arrangements Alone   Support Systems Family members   Type of Residence Private residence   Number of Stairs to Enter Residence 1   Number of Stairs Within Residence 0   Do you have animals or pets at home? No   Home or Post Acute Services Other (Comment)  (TBD)   Does the patient need discharge transport arranged? No   Financial Resource Strain   How hard is it for you to pay for the very basics like food, housing, medical care, and heating? Not hard   Housing Stability   In the last 12 months, was there a time when you were not able to pay the mortgage or rent on time? N   In the past 12 months, how many times have you moved where you were living? 0   At any time in the past 12 months, were you homeless or living in a shelter (including now)? N   Transportation Needs   In the past 12 months, has lack of transportation kept you from medical appointments or from getting medications? no   In the past 12 months, has lack of transportation kept you from meetings, work, or from getting things needed for daily living? No   Patient Choice   Patient / Family choosing to utilize agency / facility established prior to hospitalization No   Stroke Family Assessment   Stroke Family Assessment Needed No

## 2025-02-06 LAB
ALBUMIN SERPL BCP-MCNC: 3 G/DL (ref 3.4–5)
ALP SERPL-CCNC: 51 U/L (ref 33–136)
ALT SERPL W P-5'-P-CCNC: 43 U/L (ref 7–45)
ANION GAP SERPL CALCULATED.3IONS-SCNC: 7 MMOL/L (ref 10–20)
AST SERPL W P-5'-P-CCNC: 32 U/L (ref 9–39)
BILIRUB SERPL-MCNC: 0.5 MG/DL (ref 0–1.2)
BUN SERPL-MCNC: 22 MG/DL (ref 6–23)
CALCIUM SERPL-MCNC: 8.9 MG/DL (ref 8.6–10.3)
CHLORIDE SERPL-SCNC: 113 MMOL/L (ref 98–107)
CK SERPL-CCNC: 332 U/L (ref 0–215)
CO2 SERPL-SCNC: 31 MMOL/L (ref 21–32)
CREAT SERPL-MCNC: 0.63 MG/DL (ref 0.5–1.05)
EGFRCR SERPLBLD CKD-EPI 2021: 90 ML/MIN/1.73M*2
GLUCOSE SERPL-MCNC: 96 MG/DL (ref 74–99)
HOLD SPECIMEN: NORMAL
MAGNESIUM SERPL-MCNC: 1.79 MG/DL (ref 1.6–2.4)
PHOSPHATE SERPL-MCNC: 2 MG/DL (ref 2.5–4.9)
POTASSIUM SERPL-SCNC: 3 MMOL/L (ref 3.5–5.3)
PROT SERPL-MCNC: 5.1 G/DL (ref 6.4–8.2)
SODIUM SERPL-SCNC: 148 MMOL/L (ref 136–145)

## 2025-02-06 PROCEDURE — 99232 SBSQ HOSP IP/OBS MODERATE 35: CPT | Performed by: INTERNAL MEDICINE

## 2025-02-06 PROCEDURE — 2500000004 HC RX 250 GENERAL PHARMACY W/ HCPCS (ALT 636 FOR OP/ED): Performed by: INTERNAL MEDICINE

## 2025-02-06 PROCEDURE — 83735 ASSAY OF MAGNESIUM: CPT | Performed by: INTERNAL MEDICINE

## 2025-02-06 PROCEDURE — 97530 THERAPEUTIC ACTIVITIES: CPT | Mod: GP

## 2025-02-06 PROCEDURE — 99232 SBSQ HOSP IP/OBS MODERATE 35: CPT

## 2025-02-06 PROCEDURE — 36415 COLL VENOUS BLD VENIPUNCTURE: CPT | Performed by: INTERNAL MEDICINE

## 2025-02-06 PROCEDURE — 97161 PT EVAL LOW COMPLEX 20 MIN: CPT | Mod: GP

## 2025-02-06 PROCEDURE — 1200000002 HC GENERAL ROOM WITH TELEMETRY DAILY

## 2025-02-06 PROCEDURE — 80053 COMPREHEN METABOLIC PANEL: CPT | Performed by: INTERNAL MEDICINE

## 2025-02-06 PROCEDURE — 2500000002 HC RX 250 W HCPCS SELF ADMINISTERED DRUGS (ALT 637 FOR MEDICARE OP, ALT 636 FOR OP/ED): Performed by: INTERNAL MEDICINE

## 2025-02-06 PROCEDURE — 97535 SELF CARE MNGMENT TRAINING: CPT | Mod: GO,CO

## 2025-02-06 PROCEDURE — 84100 ASSAY OF PHOSPHORUS: CPT | Performed by: INTERNAL MEDICINE

## 2025-02-06 PROCEDURE — 82550 ASSAY OF CK (CPK): CPT | Performed by: INTERNAL MEDICINE

## 2025-02-06 RX ORDER — POTASSIUM CHLORIDE 20 MEQ/1
40 TABLET, EXTENDED RELEASE ORAL ONCE
Status: COMPLETED | OUTPATIENT
Start: 2025-02-06 | End: 2025-02-06

## 2025-02-06 RX ADMIN — ENOXAPARIN SODIUM 40 MG: 40 INJECTION SUBCUTANEOUS at 08:05

## 2025-02-06 RX ADMIN — POTASSIUM CHLORIDE 40 MEQ: 1500 TABLET, EXTENDED RELEASE ORAL at 11:00

## 2025-02-06 RX ADMIN — SODIUM CHLORIDE AND POTASSIUM CHLORIDE 75 ML/HR: 4.5; 1.49 INJECTION, SOLUTION INTRAVENOUS at 11:00

## 2025-02-06 ASSESSMENT — COGNITIVE AND FUNCTIONAL STATUS - GENERAL
TURNING FROM BACK TO SIDE WHILE IN FLAT BAD: A LOT
PERSONAL GROOMING: A LOT
PERSONAL GROOMING: A LOT
HELP NEEDED FOR BATHING: A LITTLE
DAILY ACTIVITIY SCORE: 12
MOBILITY SCORE: 16
HELP NEEDED FOR BATHING: A LOT
DRESSING REGULAR LOWER BODY CLOTHING: A LITTLE
CLIMB 3 TO 5 STEPS WITH RAILING: TOTAL
MOVING TO AND FROM BED TO CHAIR: A LOT
CLIMB 3 TO 5 STEPS WITH RAILING: TOTAL
MOBILITY SCORE: 11
TOILETING: A LOT
MOVING FROM LYING ON BACK TO SITTING ON SIDE OF FLAT BED WITH BEDRAILS: A LITTLE
WALKING IN HOSPITAL ROOM: A LITTLE
EATING MEALS: A LITTLE
DRESSING REGULAR UPPER BODY CLOTHING: A LITTLE
STANDING UP FROM CHAIR USING ARMS: A LITTLE
WALKING IN HOSPITAL ROOM: A LOT
MOVING TO AND FROM BED TO CHAIR: A LITTLE
DRESSING REGULAR UPPER BODY CLOTHING: A LOT
TOILETING: A LOT
STANDING UP FROM CHAIR USING ARMS: A LOT
TURNING FROM BACK TO SIDE WHILE IN FLAT BAD: A LITTLE
EATING MEALS: A LITTLE
DRESSING REGULAR LOWER BODY CLOTHING: TOTAL
MOVING FROM LYING ON BACK TO SITTING ON SIDE OF FLAT BED WITH BEDRAILS: A LOT
DAILY ACTIVITIY SCORE: 16

## 2025-02-06 ASSESSMENT — PAIN SCALES - GENERAL
PAINLEVEL_OUTOF10: 0 - NO PAIN

## 2025-02-06 ASSESSMENT — PAIN - FUNCTIONAL ASSESSMENT
PAIN_FUNCTIONAL_ASSESSMENT: 0-10
PAIN_FUNCTIONAL_ASSESSMENT: 0-10

## 2025-02-06 ASSESSMENT — PAIN SCALES - WONG BAKER: WONGBAKER_NUMERICALRESPONSE: NO HURT

## 2025-02-06 ASSESSMENT — ACTIVITIES OF DAILY LIVING (ADL): HOME_MANAGEMENT_TIME_ENTRY: 24

## 2025-02-06 NOTE — PROGRESS NOTES
"Shirley Grajeda is a 79 y.o. female on day 2 of admission presenting with fall and rhabdomyolysis.     Subjective   She was found on the floor of her home last night by her son.  She was brought to the emergency department at [facility] and found to be in mild rhabdomyolysis.  She had an abnormal urinalysis with WBCs.  She had no acute complaints.  She was sleepy, arousable    Objective     Physical Exam  General: awake, alert, oriented, responsive  Cardiovascular: regular, normal S1 and S2  Lungs: good air entry bilaterally, clear to auscultation  Extremities: no peripheral cyanosis, no pedal edema  Neuro: alert, oriented x 3, no focal weakness      Last Recorded Vitals  Blood pressure 120/51, pulse 67, temperature 36.8 °C (98.2 °F), temperature source Oral, resp. rate 18, height 1.575 m (5' 2\"), weight 68 kg (150 lb), SpO2 96%.  Intake/Output last 3 Shifts:  I/O last 3 completed shifts:  In: 100 (1.5 mL/kg) [IV Piggyback:100]  Out: - (0 mL/kg)   Weight: 68 kg     Relevant Results  Lab Results   Component Value Date    WBC 11.0 02/05/2025    HGB 12.5 02/05/2025    HCT 39.4 02/05/2025    MCV 93 02/05/2025     02/05/2025     Lab Results   Component Value Date    GLUCOSE 96 02/06/2025    CALCIUM 8.9 02/06/2025     (H) 02/06/2025    K 3.0 (L) 02/06/2025    CO2 31 02/06/2025     (H) 02/06/2025    BUN 22 02/06/2025    CREATININE 0.63 02/06/2025     Scheduled medications  enoxaparin, 40 mg, subcutaneous, Daily  potassium chloride CR, 40 mEq, oral, Once      Continuous medications  potassium chloride-0.45 % NaCl, 75 mL/hr, Last Rate: 75 mL/hr (02/05/25 1646)      PRN medications  PRN medications: acetaminophen, alum-mag hydroxide-simeth, bisacodyl, melatonin, ondansetron, ondansetron ODT      Assessment/Plan   Assessment & Plan    Fall.    PT/OT    Rhabdomyolysis  Resolved with IV hydration.    Hypernatremia  On 1/2 normal saline  Improving    Hypokalemia  Replace potassium    Abnormal urinalysis  Repeat " urinalysis showed no evidence of UTI.  Initial urinalysis did not show nitrites or leukocyte esterase.  Therefore no urine culture was sent with either sample.  Discontinue ceftriaxone    Elevated liver enzymes  Mild biliary sludge and fatty liver on ultrasound  Liver enzymes have returned to normal    Abnormal EKG.   An EKG showed right bundle branch block  Echocardiogram done, showed normal LV systolic function  Seen by cardiology for abnormal EKG and elevated troponin      Plan  Replace potassium  Hyponatremia almost corrected  Discharge planning      Tay Roth MD

## 2025-02-06 NOTE — PROGRESS NOTES
Occupational Therapy    OT Treatment    Patient Name: Shirley Grajeda  MRN: [id number]  Department: [address]  Room: [address] [place], [place] [address]  Today's Date: 2/6/2025  Time Calculation  Start Time: 0937  Stop Time: 1001  Time Calculation (min): 24 min        Assessment:  OT Assessment: Gradual progress made towards OT goals. Continue with current OT POC to increase strength, balance and functional tolerance to maximize safety and independence during ADLs.  Barriers to Discharge Home: Caregiver assistance, Cognition needs, Physical needs  Caregiver Assistance: Patient lives alone and/or does not have reliable caregiver assistance  Cognition Needs: Insight of patient limited regarding functional ability/needs  Physical Needs: 24hr ADL assistance needed, 24hr mobility assistance needed, Ambulating household distances limited by function/safety  Evaluation/Treatment Tolerance: Patient limited by fatigue  End of Session Communication: Bedside nurse  End of Session Patient Position: Up in chair, Alarm on (all needs in reach)  OT Assessment Results: Decreased ADL status, Decreased upper extremity range of motion, Decreased upper extremity strength, Decreased safe judgment during ADL, Decreased cognition, Decreased endurance, Decreased functional mobility  Evaluation/Treatment Tolerance: Patient limited by fatigue  Plan:  Treatment Interventions: ADL retraining, Functional transfer training, UE strengthening/ROM, Endurance training, Cognitive reorientation, Patient/family training, Equipment evaluation/education, Compensatory technique education  OT Frequency: 3 times per week  OT Discharge Recommendations: Moderate intensity level of continued care  OT Recommended Transfer Status: Minimal assist, Moderate assist, Assist of 1  OT - OK to Discharge: Yes  Treatment Interventions: ADL retraining, Functional transfer training, UE strengthening/ROM, Endurance training, Cognitive reorientation, Patient/family training, Equipment evaluation/education,  Compensatory technique education    Subjective   Previous Visit Info:  OT Last Visit  OT Received On: 02/06/25  General:  General  Reason for Referral: Impaired ADLs s/p fall  Past Medical History Relevant to Rehab: hyperlipid, osteoporosis, pneumonia, UTI, prediabetes, nephrolithiasis, psoriasis, knee surgeries  Prior to Session Communication: Bedside nurse  Patient Position Received: Bed, 3 rail up, Alarm on  General Comment: Pt cleared for OT session per nursing, pleasant and cooperative this date. Significantly increased time required for task completion.  Precautions:  Hearing/Visual Limitations: WFL  Medical Precautions: Fall precautions  Precautions Comment: telemetry, purewick            Pain:  Pain Assessment  Pain Assessment: 0-10  0-10 (Numeric) Pain Score: 0 - No pain  Clinical Progression: Not changed    Objective    Cognition:  Cognition  Overall Cognitive Status: Impaired  Orientation Level: Disoriented to situation  Safety/Judgement: Exceptions to WFL  Insight: Moderate  Impulsive: Mildly  Task Initiation: Initiates with cues  Processing Speed: Delayed  Coordination:  Movements are Fluid and Coordinated: No  Upper Body Coordination: slower rate and decreased accuracy of movements L>R  Lower Body Coordination: slower rate of movement  Activities of Daily Living: Feeding  Feeding Comments: at end of session pt set-up with food tray    Grooming  Grooming Comments: hair management tasks completed with Josy while seated EOB.    UE Bathing  UE Bathing Comments: UB bathing completed seated EOB with Josy- significantly increased time and verbal cues required for task initiation/ termination. Perineal hygiene completed using compensatory movements from seated position    UE Dressing  UE Dressing Comments: modA required to don hospital gown while seated EOB for line management.  Functional Standing Tolerance:     Bed Mobility/Transfers: Bed Mobility  Bed Mobility: Yes  Bed Mobility 1  Bed Mobility 1: Supine to  sitting, Scooting  Level of Assistance 1: Maximum assistance  Bed Mobility Comments 1: HOB moderately elevated- increased time and verbal cues required for task completion with use of draw sheet    Transfers  Transfer: Yes  Transfer 1  Trials/Comments 1: sit<>stand completed from standard EOB height with FWW and modA- verbal/ tactile cues required for proper hand placement to increase safety and decrease risk of falls. Bed>chair transfer completed with FWW and Josy- single-step verbal cues required for sequencing.      Outcome Measures:[facility] Daily Activity  Putting on and taking off regular lower body clothing: Total  Bathing (including washing, rinsing, drying): A lot  Putting on and taking off regular upper body clothing: A lot  Toileting, which includes using toilet, bedpan or urinal: A lot  Taking care of personal grooming such as brushing teeth: A lot  Eating Meals: A little  Daily Activity - Total Score: 12        Education Documentation  ADL Training, taught by HARLEY Rivas at 2/6/2025 12:37 PM.  Learner: Patient  Readiness: Acceptance  Method: Explanation, Demonstration  Response: Needs Reinforcement    Education Comments  Education provided on role of OT/POC, safety awareness throughout functional tasks/transfers, importance of activity/ rest routine, EC/WS techniques, and use of call light for assistance. Questions, comments and concerns addressed regarding OT.      Goals:  Encounter Problems       Encounter Problems (Active)       Bathing       LTG - Patient will utilize adaptive techniques to bathe body Min A (Progressing)       Start:  02/05/25    Expected End:  02/19/25               Dressing Upper Extremities       LTG - Patient will utilize adaptive techniques/equipment to dress upper body SBA (Progressing)       Start:  02/05/25    Expected End:  02/19/25               Dressings Lower Extremities       LTG - Patient will utilize adaptive techniques/equipment to dress lower body Min A  (Progressing)       Start:  02/05/25    Expected End:  02/19/25               Eating       LTG - Patient will feed self Independent (Progressing)       Start:  02/05/25    Expected End:  02/19/25               Functional Balance       LTG - Patient will maintain balance SBA to complete ADLs (Progressing)       Start:  02/05/25    Expected End:  02/19/25               Grooming       LTG - Patient will utilize adaptive techniques/equipment to complete daily grooming activities Independent (Progressing)       Start:  02/05/25    Expected End:  02/19/25               Toileting       LTG - Patient will utilize adaptive techniques/equipment to complete daily toileting tasks Min A (Progressing)       Start:  02/05/25    Expected End:  02/19/25

## 2025-02-06 NOTE — PROGRESS NOTES
02/06/25 1350   Discharge Planning   Expected Discharge Disposition SNF  ([facility])     Facility will have bed tomorrow   [facility] updated at this time      No precert needed     ** do not discharge without speaking to care coordination**  MD WILL NEED TO SIGN/CERTIFY [facility] AT THE TIME OF DISCHARGE FOR SNF.

## 2025-02-06 NOTE — ED PROCEDURE NOTE
Procedure  Critical Care    Performed by: Brandan Matute DO  Authorized by: Brandan Matute DO    Critical care provider statement:     Critical care time (minutes):  33    Critical care time was exclusive of:  Separately billable procedures and treating other patients    Critical care was necessary to treat or prevent imminent or life-threatening deterioration of the following conditions:  Metabolic crisis    Critical care was time spent personally by me on the following activities:  Ordering and performing treatments and interventions, ordering and review of laboratory studies, ordering and review of radiographic studies, re-evaluation of patient's condition, review of old charts, examination of patient, evaluation of patient's response to treatment, development of treatment plan with patient or surrogate and obtaining history from patient or surrogate    Care discussed with: admitting provider                 Brandan Matute DO  02/06/25 0119

## 2025-02-06 NOTE — PROGRESS NOTES
"Shirley Grajeda is a 79 y.o. female on day 2 of admission presenting with Fall, initial encounter.    Subjective   Patient was sleeping comfortably upon entering her room.  Patient denies chest pain, palpitations, pressure, tightness.  Patient denies shortness of breath, on room air       Objective     Physical Exam  Vitals and nursing note reviewed.   Constitutional:       General: She is not in acute distress.     Appearance: She is not ill-appearing.   HENT:      Head: Normocephalic and atraumatic.      Mouth/Throat:      Mouth: Mucous membranes are moist.      Pharynx: Oropharynx is clear.   Eyes:      Extraocular Movements: Extraocular movements intact.   Cardiovascular:      Rate and Rhythm: Normal rate and regular rhythm.      Heart sounds: Normal heart sounds. No murmur heard.     No friction rub. No gallop.      Comments: Sinus rhythm on telemetry  Pulmonary:      Effort: Pulmonary effort is normal. No respiratory distress.      Breath sounds: No stridor. No wheezing or rhonchi.      Comments: Bilateral upper lobes clear, bilateral lower lobes clear/diminished  Chest:      Chest wall: No tenderness.   Abdominal:      General: Bowel sounds are normal.      Palpations: Abdomen is soft.   Musculoskeletal:         General: Normal range of motion.      Cervical back: Normal range of motion and neck supple.   Skin:     General: Skin is warm and dry.      Capillary Refill: Capillary refill takes less than 2 seconds.   Neurological:      Mental Status: She is alert and oriented to person, place, and time.   Psychiatric:         Mood and Affect: Mood normal.         Behavior: Behavior normal.         Last Recorded Vitals  Blood pressure 120/51, pulse 67, temperature 36.8 °C (98.2 °F), temperature source Oral, resp. rate 18, height 1.575 m (5' 2\"), weight 68 kg (150 lb), SpO2 96%.  Intake/Output last 3 Shifts:  I/O last 3 completed shifts:  In: 100 (1.5 mL/kg) [IV Piggyback:100]  Out: - (0 mL/kg)   Weight: 68 kg "     Relevant Results  Results for orders placed or performed during the hospital encounter of 02/04/25 (from the past 24 hours)   Potassium   Result Value Ref Range    Potassium 2.9 (LL) 3.5 - 5.3 mmol/L   Transthoracic Echo (TTE) Complete   Result Value Ref Range    AV pk savannah 1.65 m/s    LVOT diam 1.98 cm    MV E/A ratio 1.11     Tricuspid annular plane systolic excursion 1.9 cm    LV Biplane EF 59 %    LA vol index A/L 21.9 ml/m2    MV avg E/e' ratio 11.36     LV EF 63 %    RV free wall pk S' 16.51 cm/s    RVSP 31.2 mmHg    LVIDd 3.77 cm    AV pk grad 11 mmHg    Aortic Valve Area by Continuity of Peak Velocity 2.39 cm2    LV A4C EF 58.2    Urinalysis with Reflex Culture and Microscopic   Result Value Ref Range    Color, Urine Yellow Light-Yellow, Yellow, Dark-Yellow    Appearance, Urine Clear Clear    Specific Gravity, Urine 1.030 1.005 - 1.035    pH, Urine 6.5 5.0, 5.5, 6.0, 6.5, 7.0, 7.5, 8.0    Protein, Urine 30 (1+) (A) NEGATIVE, 10 (TRACE), 20 (TRACE) mg/dL    Glucose, Urine Normal Normal mg/dL    Blood, Urine NEGATIVE NEGATIVE mg/dL    Ketones, Urine 40 (2+) (A) NEGATIVE mg/dL    Bilirubin, Urine NEGATIVE NEGATIVE mg/dL    Urobilinogen, Urine 2 (1+) (A) Normal mg/dL    Nitrite, Urine NEGATIVE NEGATIVE    Leukocyte Esterase, Urine NEGATIVE NEGATIVE   Urinalysis Microscopic   Result Value Ref Range    WBC, Urine 1-5 1-5, NONE /HPF    RBC, Urine 1-2 NONE, 1-2, 3-5 /HPF    Mucus, Urine 2+ Reference range not established. /LPF   Creatine Kinase   Result Value Ref Range    Creatine Kinase 332 (H) 0 - 215 U/L   Comprehensive Metabolic Panel   Result Value Ref Range    Glucose 96 74 - 99 mg/dL    Sodium 148 (H) 136 - 145 mmol/L    Potassium 3.0 (L) 3.5 - 5.3 mmol/L    Chloride 113 (H) 98 - 107 mmol/L    Bicarbonate 31 21 - 32 mmol/L    Anion Gap 7 (L) 10 - 20 mmol/L    Urea Nitrogen 22 6 - 23 mg/dL    Creatinine 0.63 0.50 - 1.05 mg/dL    eGFR 90 >60 mL/min/1.73m*2    Calcium 8.9 8.6 - 10.3 mg/dL    Albumin 3.0 (L)  3.4 - 5.0 g/dL    Alkaline Phosphatase 51 33 - 136 U/L    Total Protein 5.1 (L) 6.4 - 8.2 g/dL    AST 32 9 - 39 U/L    Bilirubin, Total 0.5 0.0 - 1.2 mg/dL    ALT 43 7 - 45 U/L   Magnesium   Result Value Ref Range    Magnesium 1.79 1.60 - 2.40 mg/dL   Phosphorus   Result Value Ref Range    Phosphorus 2.0 (L) 2.5 - 4.9 mg/dL          Assessment/Plan   Assessment & Plan  Fall, initial encounter    Abnormal EKG  Left bundle branch block  Fall  Rhabdomyolysis   Abnormal LFTs   Hyperlipidemia        2/5: As stated above. Patient had an abnormal EKG in the ED. No previous EKG available for comparison.  Patient has a wide QRS with an ST abnormality.  No definitive ST elevation with ischemic changes. Will order another EKG to be completed this morning for comparison.  Primary team ordered transthoracic echocardiogram which will be completed today.  Patient denies chest pain, palpitations, pressure.  Denies shortness of breath or dizziness.  This morning's vitals show a heart rate of 84, blood pressure 123/94, satting 97% on room air.  Patient does not have any swelling in any of her extremities at this time, she is on a continuous IV infusion. Elevated troponins noted are from rhabdomyolysis. Upon the results of the echocardiogram, we can estimate hospital discharge in 1 to 2 days.  With the patient having a significant smoking history we suggest calcium scoring and a nuclear stress test to be completed in the outpatient setting.  Patient should follow-up with cardiologist Dr. Mcpherson in the outpatient settings in 2 to 3 weeks.     2/6: Patient was sleeping comfortably upon entering her room.  Upon awakening patient is very pleasant. She denies chest pain, palpitations, pressure, tightness.  Patient denies shortness of breath, on room air. Echocardiogram was completed yesterday which showed an EF of 60 to 65%, no evidence of left ventricular hypertrophy, there is a trace to mild tricuspid regurgitation. Patient does not have  any swelling in any of her extremities at this time, she is on a continuous IV infusion. This morning's vitals show a heart rate of 67, blood pressure 120/51, satting 96% on room air.  On telemetry patient is in sinus rhythm.  Overnight it was difficult to interpret the telemetry because there was so much artifact present. Current labs this morning a sodium of 148, potassium 3.0, and improved ALT of 43, improved AST of 32, improved creatine kinase of 332, hemoglobin 12.5, hematocrit 39.4.  Dr. Mcpherson adjusted her IV fluids yesterday to sodium chloride 0.45% with potassium 20 mEq.  With patient potassium only being 3.0, primary team has written for a one-time dose of 40 mEq potassium supplementation. With the patient having a significant smoking history we suggest calcium scoring and a nuclear stress test to be completed in the outpatient setting. From the cardiac perspective, we have no further recommendations at this time and we will sign off.  Please feel free to reach out for any questions or concerns.  Patient should follow-up with cardiologist Dr. Mcpherson in the outpatient settings in 2 to 3 weeks.       I spent 30 minutes in the professional and overall care of this patient.      Mamta Bach, APRN-CNP

## 2025-02-06 NOTE — PROGRESS NOTES
Physical Therapy    Physical Therapy Evaluation & Treatment    Patient Name: Shirley Grajeda  MRN: [id number]  Department: [address]  Room: [address] [place], [place] [address]  Today's Date: 2/6/2025   Time Calculation  Start Time: 1054  Stop Time: 1134  Time Calculation (min): 40 min    Assessment/Plan   PT Assessment  PT Assessment Results: Decreased strength, Decreased endurance, Impaired balance, Decreased mobility, Decreased safety awareness  Rehab Prognosis: Good  Barriers to Discharge Home: Caregiver assistance, Physical needs  Caregiver Assistance: Patient lives alone and/or does not have reliable caregiver assistance  Physical Needs: 24hr mobility assistance needed  Evaluation/Treatment Tolerance: Patient limited by fatigue  End of Session Communication: Bedside nurse  Assessment Comment: 79 year old female presents with decline from baseline functional mobility, impaired balance, decreased strength , and decreased tolerance to activity;  patient would benefit from skilled physical therapy services to safely maximize functional mobility to modified independent levels.  End of Session Patient Position: Bed, 3 rail up, Alarm on   IP OR SWING BED PT PLAN  Inpatient or Swing Bed: Inpatient  PT Plan  Treatment/Interventions: Bed mobility, Transfer training, Gait training, Balance training, Strengthening, Endurance training, Therapeutic exercise, Therapeutic activity  PT Plan: Ongoing PT  PT Frequency: 4 times per week  PT Discharge Recommendations: Moderate intensity level of continued care  PT Recommended Transfer Status: Assist x1  PT - OK to Discharge: Yes (with skilled physical therapy services at next level of care)      Subjective     General Visit Information:  General  Reason for Referral: Impaired mobility with fall  Past Medical History Relevant to Rehab: hyperlipid, osteoporosis, pneumonia, UTI, prediabetes, nephrolithiasis, psoriasis, knee surgeries  Prior to Session Communication: Bedside nurse  Patient Position Received: Up in  chair, Alarm on  General Comment: 79 year old female admit from home after being found on floor by son.  Home Living:  Home Living  Type of Home: Condo  Lives With: Alone  Home Adaptive Equipment: Walker rolling or standard, Cane  Home Layout: One level  Home Access: Stairs to enter without rails  Entrance Stairs-Rails: None  Entrance Stairs-Number of Steps: 1  Bathroom Shower/Tub: Tub/shower unit  Prior Level of Function:  Prior Function Per Pt/Caregiver Report  Ambulatory Assistance:  (Modified independent with cane)  Precautions:  Precautions  Medical Precautions: Fall precautions           Objective   Pain:  Pain Assessment  Pain Assessment: 0-10  0-10 (Numeric) Pain Score: 0 - No pain  Cognition:  Cognition  Overall Cognitive Status: Within Functional Limits    General Assessments:                  Activity Tolerance  Endurance: Decreased tolerance for upright activites  Activity Tolerance Comments: Fatigue    Sensation  Sensation Comment: Not formally assessed       Coordination  Movements are Fluid and Coordinated: No  Lower Body Coordination: Slower rate of movement alexis LE    Static Sitting Balance  Static Sitting-Balance Support: Bilateral upper extremity supported  Static Sitting-Level of Assistance: Close supervision  Static Sitting-Comment/Number of Minutes: Supervision with balance while sitting on side of bed    Static Standing Balance  Static Standing-Balance Support: Bilateral upper extremity supported  Static Standing-Level of Assistance: Moderate assistance  Static Standing-Comment/Number of Minutes: Assist with trunk support and balance during static standing with rolling walker  Functional Assessments:  Bed Mobility  Bed Mobility: Yes  Bed Mobility 1  Bed Mobility 1: Sitting to supine  Level of Assistance 1: Maximum assistance  Bed Mobility Comments 1: Assist with trunk and alexis LE    Transfers  Transfer: Yes  Transfer 1  Transfer From 1: Sit to  Transfer to 1: Stand  Technique 1: Sit to  stand  Transfer Device 1: Walker  Transfer Level of Assistance 1: Moderate assistance  Trials/Comments 1: x 2 reps; assist with trunk support and balance;  tactile cues for hand placement;  increased time and effort required to achieve sit to stand  Transfers 2  Transfer From 2: Stand to  Transfer to 2: Sit  Technique 2: Stand to sit  Transfer Device 2: Walker  Transfer Level of Assistance 2: Moderate assistance  Trials/Comments 2: x 2 reps; assist with trunk support;  verbal cues for hand placement;  decreased eccentric control  Transfers 3  Transfer From 3: Chair with arms to  Transfer to 3: Commode-standard  Technique 3: Stand pivot  Transfer Device 3: Walker  Transfer Level of Assistance 3: Moderate assistance  Trials/Comments 3: Assist with trunk support, balance, and walker positioning;  patient appeared to support body weight with alexis LE in standing;  patient required increased time and effort to weightshift and advance alexis LE during pivot portion of transfer.  Transfers 4  Transfer From 4: Commode-standard to  Transfer to 4: Bed  Technique 4: Stand pivot  Transfer Device 4: Walker  Transfer Level of Assistance 4: Moderate assistance  Trials/Comments 4: Assist with trunk support, balance, and walker positioning; patient appeared to support body weight with alexis LE in standing; patient required increased time and effort to weightshift and advance alexis LE during pivot portion of transfer.    Ambulation/Gait Training  Ambulation/Gait Training Performed: No    Stairs  Stairs: No  Extremity/Trunk Assessments:  RLE   RLE : Exceptions to WFL  Strength RLE  R Hip Flexion: 2/5  R Knee Extension: 3-/5  R Ankle Dorsiflexion: 3-/5  LLE   LLE : Exceptions to WFL  Strength LLE  L Hip Flexion: 2/5  L Knee Extension: 3-/5  L Ankle Dorsiflexion: 3-/5  Treatments:                 Bed Mobility  Bed Mobility: Yes  Bed Mobility 1  Bed Mobility 1: Sitting to supine  Level of Assistance 1: Maximum assistance  Bed Mobility Comments 1:  Assist with trunk and alexis LE    Ambulation/Gait Training  Ambulation/Gait Training Performed: No  Transfers  Transfer: Yes  Transfer 1  Transfer From 1: Sit to  Transfer to 1: Stand  Technique 1: Sit to stand  Transfer Device 1: Walker  Transfer Level of Assistance 1: Moderate assistance  Trials/Comments 1: x 2 reps; assist with trunk support and balance;  tactile cues for hand placement;  increased time and effort required to achieve sit to stand  Transfers 2  Transfer From 2: Stand to  Transfer to 2: Sit  Technique 2: Stand to sit  Transfer Device 2: Walker  Transfer Level of Assistance 2: Moderate assistance  Trials/Comments 2: x 2 reps; assist with trunk support;  verbal cues for hand placement;  decreased eccentric control  Transfers 3  Transfer From 3: Chair with arms to  Transfer to 3: Commode-standard  Technique 3: Stand pivot  Transfer Device 3: Walker  Transfer Level of Assistance 3: Moderate assistance  Trials/Comments 3: Assist with trunk support, balance, and walker positioning;  patient appeared to support body weight with alexis LE in standing;  patient required increased time and effort to weightshift and advance alexis LE during pivot portion of transfer.  Transfers 4  Transfer From 4: Commode-standard to  Transfer to 4: Bed  Technique 4: Stand pivot  Transfer Device 4: Walker  Transfer Level of Assistance 4: Moderate assistance  Trials/Comments 4: Assist with trunk support, balance, and walker positioning; patient appeared to support body weight with alexis LE in standing; patient required increased time and effort to weightshift and advance alexis LE during pivot portion of transfer.    Stairs  Stairs: No       Outcome Measures:  [facility] Basic Mobility  Turning from your back to your side while in a flat bed without using bedrails: A lot  Moving from lying on your back to sitting on the side of a flat bed without using bedrails: A lot  Moving to and from bed to chair (including a wheelchair): A lot  Standing up  from a chair using your arms (e.g. wheelchair or bedside chair): A lot  To walk in hospital room: A lot  Climbing 3-5 steps with railing: Total  Basic Mobility - Total Score: 11    Encounter Problems       Encounter Problems (Active)       Mobility       Bed mobility including supine to sit and sit to supine with supervision. (Progressing)       Start:  02/06/25    Expected End:  02/20/25            Transfers including sit to stand and stand to sit with supervision. (Progressing)       Start:  02/06/25    Expected End:  02/20/25            Ambulate 100 feet with rolling walker and supervision.       Start:  02/06/25    Expected End:  02/20/25                   Education Documentation  Mobility Training, taught by Chris Swann, PT at 2/6/2025 11:53 AM.  Learner: Patient  Readiness: Acceptance  Method: Explanation, Demonstration  Response: Needs Reinforcement    Education Comments  No comments found.

## 2025-02-07 VITALS
DIASTOLIC BLOOD PRESSURE: 75 MMHG | OXYGEN SATURATION: 97 % | HEART RATE: 87 BPM | BODY MASS INDEX: 27.6 KG/M2 | SYSTOLIC BLOOD PRESSURE: 107 MMHG | TEMPERATURE: 98.4 F | RESPIRATION RATE: 17 BRPM | HEIGHT: 62 IN | WEIGHT: 150 LBS

## 2025-02-07 LAB
ALBUMIN SERPL BCP-MCNC: 2.8 G/DL (ref 3.4–5)
ALP SERPL-CCNC: 49 U/L (ref 33–136)
ALT SERPL W P-5'-P-CCNC: 35 U/L (ref 7–45)
ANION GAP SERPL CALCULATED.3IONS-SCNC: 7 MMOL/L (ref 10–20)
AST SERPL W P-5'-P-CCNC: 25 U/L (ref 9–39)
BILIRUB SERPL-MCNC: 0.5 MG/DL (ref 0–1.2)
BUN SERPL-MCNC: 18 MG/DL (ref 6–23)
CALCIUM SERPL-MCNC: 8.3 MG/DL (ref 8.6–10.3)
CHLORIDE SERPL-SCNC: 109 MMOL/L (ref 98–107)
CK SERPL-CCNC: 215 U/L (ref 0–215)
CO2 SERPL-SCNC: 30 MMOL/L (ref 21–32)
CREAT SERPL-MCNC: 0.52 MG/DL (ref 0.5–1.05)
EGFRCR SERPLBLD CKD-EPI 2021: >90 ML/MIN/1.73M*2
ERYTHROCYTE [DISTWIDTH] IN BLOOD BY AUTOMATED COUNT: 14.4 % (ref 11.5–14.5)
GLUCOSE SERPL-MCNC: 87 MG/DL (ref 74–99)
HCT VFR BLD AUTO: 35.2 % (ref 36–46)
HGB BLD-MCNC: 11.8 G/DL (ref 12–16)
MCH RBC QN AUTO: 29.5 PG (ref 26–34)
MCHC RBC AUTO-ENTMCNC: 33.5 G/DL (ref 32–36)
MCV RBC AUTO: 88 FL (ref 80–100)
NRBC BLD-RTO: 0 /100 WBCS (ref 0–0)
PHOSPHATE SERPL-MCNC: 2.4 MG/DL (ref 2.5–4.9)
PLATELET # BLD AUTO: 237 X10*3/UL (ref 150–450)
POTASSIUM SERPL-SCNC: 3.6 MMOL/L (ref 3.5–5.3)
PROT SERPL-MCNC: 4.8 G/DL (ref 6.4–8.2)
RBC # BLD AUTO: 4 X10*6/UL (ref 4–5.2)
SODIUM SERPL-SCNC: 142 MMOL/L (ref 136–145)
WBC # BLD AUTO: 8.7 X10*3/UL (ref 4.4–11.3)

## 2025-02-07 PROCEDURE — 80053 COMPREHEN METABOLIC PANEL: CPT | Performed by: INTERNAL MEDICINE

## 2025-02-07 PROCEDURE — 99238 HOSP IP/OBS DSCHRG MGMT 30/<: CPT | Performed by: INTERNAL MEDICINE

## 2025-02-07 PROCEDURE — 2500000004 HC RX 250 GENERAL PHARMACY W/ HCPCS (ALT 636 FOR OP/ED): Performed by: INTERNAL MEDICINE

## 2025-02-07 PROCEDURE — 84100 ASSAY OF PHOSPHORUS: CPT | Performed by: INTERNAL MEDICINE

## 2025-02-07 PROCEDURE — 36415 COLL VENOUS BLD VENIPUNCTURE: CPT | Performed by: INTERNAL MEDICINE

## 2025-02-07 PROCEDURE — 2500000001 HC RX 250 WO HCPCS SELF ADMINISTERED DRUGS (ALT 637 FOR MEDICARE OP): Performed by: INTERNAL MEDICINE

## 2025-02-07 PROCEDURE — 85027 COMPLETE CBC AUTOMATED: CPT | Performed by: INTERNAL MEDICINE

## 2025-02-07 PROCEDURE — 82550 ASSAY OF CK (CPK): CPT | Performed by: INTERNAL MEDICINE

## 2025-02-07 RX ADMIN — ENOXAPARIN SODIUM 40 MG: 40 INJECTION SUBCUTANEOUS at 08:08

## 2025-02-07 RX ADMIN — ACETAMINOPHEN 650 MG: 325 TABLET ORAL at 04:27

## 2025-02-07 RX ADMIN — SODIUM CHLORIDE AND POTASSIUM CHLORIDE 75 ML/HR: 4.5; 1.49 INJECTION, SOLUTION INTRAVENOUS at 03:40

## 2025-02-07 ASSESSMENT — PAIN - FUNCTIONAL ASSESSMENT: PAIN_FUNCTIONAL_ASSESSMENT: 0-10

## 2025-02-07 ASSESSMENT — PAIN SCALES - GENERAL
PAINLEVEL_OUTOF10: 5 - MODERATE PAIN
PAINLEVEL_OUTOF10: 0 - NO PAIN
PAINLEVEL_OUTOF10: 2

## 2025-02-07 ASSESSMENT — PAIN SCALES - WONG BAKER: WONGBAKER_NUMERICALRESPONSE: HURTS LITTLE BIT

## 2025-02-07 ASSESSMENT — PAIN DESCRIPTION - LOCATION: LOCATION: HEAD
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
## 2025-02-07 NOTE — PROGRESS NOTES
"Shirley Grajeda is a 79 y.o. female on day 3 of admission presenting with fall and rhabdomyolysis.     Subjective   She was found on the floor of her home last night by her son.  She was brought to the emergency department at [facility], [place], operated by [facility] and found to be in mild rhabdomyolysis which has essentially resolved.  She was awake and alert, no acute complaints.    Objective     Physical Exam  General: awake, alert, oriented, responsive  Cardiovascular: regular, normal S1 and S2  Lungs: good air entry bilaterally, clear to auscultation  Extremities: no peripheral cyanosis, no pedal edema  Neuro: alert, oriented x 3, no focal weakness      Last Recorded Vitals  Blood pressure 124/55, pulse 78, temperature 36.9 °C (98.4 °F), temperature source Oral, resp. rate 17, height 1.575 m (5' 2\"), weight 68 kg (150 lb), SpO2 96%.  Intake/Output last 3 Shifts:  I/O last 3 completed shifts:  In: 2782.5 (40.9 mL/kg) [I.V.:2782.5 (40.9 mL/kg)]  Out: 600 (8.8 mL/kg) [Urine:600 (0.2 mL/kg/hr)]  Weight: 68 kg     Relevant Results  Lab Results   Component Value Date    WBC 8.7 02/07/2025    HGB 11.8 (L) 02/07/2025    HCT 35.2 (L) 02/07/2025    MCV 88 02/07/2025     02/07/2025     Lab Results   Component Value Date    GLUCOSE 87 02/07/2025    CALCIUM 8.3 (L) 02/07/2025     02/07/2025    K 3.6 02/07/2025    CO2 30 02/07/2025     (H) 02/07/2025    BUN 18 02/07/2025    CREATININE 0.52 02/07/2025     Scheduled medications  enoxaparin, 40 mg, subcutaneous, Daily      Continuous medications  potassium chloride-0.45 % NaCl, 75 mL/hr, Last Rate: 75 mL/hr (02/07/25 0340)      PRN medications  PRN medications: acetaminophen, alum-mag hydroxide-simeth, bisacodyl, melatonin, ondansetron, ondansetron ODT      Assessment/Plan   Assessment & Plan    Fall.    PT/OT    Rhabdomyolysis  Resolved with IV hydration.    Hypernatremia  Resolved    Hypokalemia  Replace potassium    Abnormal urinalysis  Repeat urinalysis showed no evidence of UTI.  " Initial urinalysis did not show nitrites or leukocyte esterase.  Therefore no urine culture was sent with either sample.  Discontinue ceftriaxone    Elevated liver enzymes  Mild biliary sludge and fatty liver on ultrasound  Liver enzymes have returned to normal    Abnormal EKG.   An EKG showed right bundle branch block  Echocardiogram done, showed normal LV systolic function  Seen by cardiology for abnormal EKG and elevated troponin. No additional workup planned.       Plan  Discharge to SNF.       Tay Roth MD

## 2025-02-07 NOTE — PROGRESS NOTES
02/07/25 1245   Discharge Planning   Expected Discharge Disposition SNF  ([facility])   Has discharge transport been arranged? Yes   What day is the transport expected? 02/07/25   What time is the transport expected? 1600     7000 Completed   AVS and goldenrod sent to facility   Desmond powell   RN made aware and given report number

## 2025-02-07 NOTE — DISCHARGE SUMMARY
Discharge Diagnosis  Fall, initial encounter  Hyponatremia  Rhabdomyolysis  With a liver enzymes, resolved    Issues Requiring Follow-Up      Test Results Pending At Discharge  Pending Labs       Order Current Status    Extra Urine Gray Tube Collected (02/05/25 1705)    Urinalysis with Reflex Culture and Microscopic In process    Blood Culture Preliminary result    Blood Culture Preliminary result            Hospital Course   79-year-old female patient with no significant past medical history, not taking medication.  She presented to the emergency department after a fall at home.  She was trying to get off the toilet when she fell and she was found on the floor of the bathroom by her son.    In the hospital, a urinalysis was abnormal with white blood cells and red blood cells, however no leukocyte esterase so urine culture was not done.  She was initially on IV ceftriaxone.  She had rhabdomyolysis with a CK level of 2262.  She was hypernatremic with a sodium of 152.  She was admitted to the floor and started on D5 water and subsequently half-normal saline.  She also had potassium replaced.  She was hemodynamically stable.  Rhabdomyolysis resolved.  Repeat urinalysis showed no evidence of UTI and ceftriaxone was stopped.  She was seen by physical therapy and Occupational Therapy.  Arrangements have been completed for discharge to skilled nursing facility.  She will be going to [place] for physical rehabilitation.  She had already taken any medication at home and had no discharge medicines.      Home Medications     Medication List      You have not been prescribed any medications.       Outpatient Follow-Up  No future appointments.    Tay Roth MD

## 2025-02-09 LAB
BACTERIA BLD CULT: NORMAL
BACTERIA BLD CULT: NORMAL

## 2025-02-17 NOTE — DOCUMENTATION CLARIFICATION NOTE
PATIENT:               ANGELA REEVES  ACCT #:                  [id number]  MRN:                       [id number]  :                       1945  ADMIT DATE:       2025 8:07 PM  DISCH DATE:        2025 4:34 PM  RESPONDING PROVIDER #:        [id number]          PROVIDER RESPONSE TEXT:    Hypernatremia diagnosis    CDI QUERY TEXT:    Clarification    Instruction:    Based on your assessment of the patient and the clinical information, please provide the requested documentation by clicking on the appropriate radio button and enter any additional information if prompted.    Question: Based on your medical judgment, can you please clarify which of these conditions is the most clinically supported    When answering this query, please exercise your independent professional judgment. The fact that a question is being asked, does not imply that any particular answer is desired or expected.    The patient's clinical indicators include:  Clinical Information: 79y.o. F admitted s/p fall w/Rhabdomyolysis, UTI, abnormal LFTs, elevated Troponins, dehydration & Hypernatremia.    There is conflicting documentation in the medical record which requires clarification.     ED Provider Note: CMP demonstrates hypernatremia sodium of 152    2/ H&P: Dehydration. Patient has significant hypernatremia.     Progress Note: Hyponatremia almost corrected     Progress Note: Hyponatremia resolved     D/C Summary: Hyponatremia, Rhabdomyolysis    Clinical Indicators:   Na 152  / Na 150  / Na 148  / Na 142    Treatment:  - NS x2L  Options provided:  -- Hypernatremia diagnosis  -- Hyponatremia diagnosis  -- Other - I will add my own diagnosis  -- Refer to Clinical Documentation Reviewer    Query created by: Ivy Barbosa on 2/10/2025 7:41 AM      Electronically signed by:  UMA RIGGINS MD 2025 6:38 PM

## 2025-03-20 ENCOUNTER — APPOINTMENT (OUTPATIENT)
Dept: PRIMARY CARE | Facility: CLINIC | Age: 80
End: 2025-03-20
Payer: MEDICARE